# Patient Record
Sex: FEMALE | Race: WHITE | Employment: OTHER | URBAN - METROPOLITAN AREA
[De-identification: names, ages, dates, MRNs, and addresses within clinical notes are randomized per-mention and may not be internally consistent; named-entity substitution may affect disease eponyms.]

---

## 2018-07-06 ENCOUNTER — HOSPITAL ENCOUNTER (OUTPATIENT)
Age: 83
Discharge: HOME | End: 2018-07-08
Payer: COMMERCIAL

## 2018-07-06 ENCOUNTER — HOSPITAL ENCOUNTER (OUTPATIENT)
Dept: WOUND CARE | Age: 83
Discharge: HOME | End: 2018-07-06
Payer: COMMERCIAL

## 2018-07-06 VITALS — WEIGHT: 122 LBS | RESPIRATION RATE: 18 BRPM | TEMPERATURE: 98.6 F | HEIGHT: 62 IN | BODY MASS INDEX: 22.45 KG/M2

## 2018-07-06 DIAGNOSIS — I73.9 PVD (PERIPHERAL VASCULAR DISEASE) (HCC): ICD-10-CM

## 2018-07-06 DIAGNOSIS — L97.912 TRAUMATIC ULCER OF RIGHT LOWER LEG WITH FAT LAYER EXPOSED (HCC): ICD-10-CM

## 2018-07-06 DIAGNOSIS — I82.4Z9 DEEP VEIN THROMBOSIS (DVT) OF DISTAL VEIN OF LOWER EXTREMITY, UNSPECIFIED CHRONICITY, UNSPECIFIED LATERALITY (HCC): Primary | ICD-10-CM

## 2018-07-06 PROCEDURE — 87205 SMEAR GRAM STAIN: CPT

## 2018-07-06 PROCEDURE — 99213 OFFICE O/P EST LOW 20 MIN: CPT

## 2018-07-06 PROCEDURE — 11042 DBRDMT SUBQ TIS 1ST 20SQCM/<: CPT

## 2018-07-06 PROCEDURE — 87070 CULTURE OTHR SPECIMN AEROBIC: CPT

## 2018-07-06 PROCEDURE — 87075 CULTR BACTERIA EXCEPT BLOOD: CPT

## 2018-07-06 RX ORDER — ESCITALOPRAM OXALATE 5 MG/1
5 TABLET ORAL DAILY
COMMUNITY

## 2018-07-06 RX ORDER — LEVOTHYROXINE SODIUM 0.05 MG/1
50 TABLET ORAL DAILY
COMMUNITY

## 2018-07-06 RX ORDER — MEMANTINE HYDROCHLORIDE 28 MG/1
CAPSULE, EXTENDED RELEASE ORAL DAILY
COMMUNITY

## 2018-07-06 RX ORDER — IBUPROFEN 600 MG/1
600 TABLET ORAL DAILY
COMMUNITY
End: 2019-06-03 | Stop reason: SDUPTHER

## 2018-07-06 RX ORDER — DONEPEZIL HYDROCHLORIDE 10 MG/1
10 TABLET, FILM COATED ORAL DAILY
COMMUNITY

## 2018-07-06 ASSESSMENT — PAIN DESCRIPTION - ORIENTATION: ORIENTATION: LEFT;POSTERIOR

## 2018-07-06 ASSESSMENT — PAIN DESCRIPTION - LOCATION: LOCATION: LEG

## 2018-07-06 ASSESSMENT — PAIN DESCRIPTION - PROGRESSION: CLINICAL_PROGRESSION: NOT CHANGED

## 2018-07-06 ASSESSMENT — PAIN DESCRIPTION - PAIN TYPE: TYPE: ACUTE PAIN

## 2018-07-06 ASSESSMENT — PAIN SCALES - GENERAL: PAINLEVEL_OUTOF10: 3

## 2018-07-06 ASSESSMENT — PAIN DESCRIPTION - DESCRIPTORS: DESCRIPTORS: ACHING

## 2018-07-06 NOTE — CONSULTS
blade, all hyperkeratotic, nonviable,  fibrotic tissue removed down into subcutaneous tissue without incident,  excised, flushed with copious amounts of normal sterile saline. Apply  Laurel and dressing change every other day. Keep dressing clean, dry, and  intact. Do not get wet. Rest, elevate, offload. Any problems or  questions, contact my office or contact the [address] [place] [address] as soon as  possible. Arterial ultrasounds are needed and noninvasive studies to help,  evaluate, manage, and treat it accordingly. Ultrasounds are needed to  help, evaluate, manage, and treat it accordingly. Deep tissue culture was  taken to help, evaluate, manage, and treat it accordingly and we will  follow.     I would like to thank [facility] for referral.        Kaleb Box DPM    D: 07/06/2018 9:50:22       T: 07/06/2018 10:43:25     [email or web address]  Job#: [id number]     Doc#: [id number]    CC:  Michelle Ramirez MD

## 2018-07-08 LAB — ANAEROBIC CULTURE: NORMAL

## 2018-07-09 LAB
GRAM STAIN RESULT: NORMAL
WOUND/ABSCESS: NORMAL

## 2018-07-13 ENCOUNTER — HOSPITAL ENCOUNTER (OUTPATIENT)
Dept: WOUND CARE | Age: 83
Discharge: HOME | End: 2018-07-13
Payer: COMMERCIAL

## 2018-07-13 VITALS
SYSTOLIC BLOOD PRESSURE: 138 MMHG | DIASTOLIC BLOOD PRESSURE: 70 MMHG | TEMPERATURE: 98.8 F | BODY MASS INDEX: 22.45 KG/M2 | WEIGHT: 122 LBS | HEIGHT: 62 IN | RESPIRATION RATE: 18 BRPM | HEART RATE: 76 BPM

## 2018-07-13 PROCEDURE — 11042 DBRDMT SUBQ TIS 1ST 20SQCM/<: CPT

## 2018-07-13 NOTE — PROGRESS NOTES
[facility] Followup Visit Note    Referring Physician : Dorys Gracia MD  [facility]. RECORD NUMBER:  [id number]  AGE: 81 y.o. GENDER: female  : 1935  EPISODE DATE:  2018    Subjective:     Chief Complaint   Patient presents with    Wound Check     right lower leg      HISTORY of PRESENT ILLNESS LIBRA Colon is a 81 y.o. female who presents today for wound/ulcer evaluation. History of Wound Context:  1 week     Wound/Ulcer Pain Timing/Severity: constant, intermittent  Quality of pain: aching  Severity:  2 / 10   Modifying Factors: Pain worsens with walking  Associated Signs/Symptoms: edema    Ulcer Identification:  Ulcer Type: venous  Contributing Factors: edema    Diabetic/Pressure/Non Pressure Ulcers only:  Ulcer: Non-Pressure ulcer, fat layer exposed    Wound: N/A        PAST MEDICAL HISTORY      Diagnosis Date    Arthritis     Dementia     Hypothyroid      Past Surgical History:   Procedure Laterality Date    BUNIONECTOMY      HYSTERECTOMY       History reviewed. No pertinent family history.   Social History   Substance Use Topics    Smoking status: Never Smoker    Smokeless tobacco: Never Used    Alcohol use 4.2 oz/week     7 Glasses of wine per week     Allergies   Allergen Reactions    Sulfa Antibiotics Hives     Current Outpatient Prescriptions on File Prior to Encounter   Medication Sig Dispense Refill    methotrexate (RHEUMATREX) 2.5 MG chemo tablet Take 2.5 mg by mouth once a week 6 tabs every week on wednesday      escitalopram (LEXAPRO) 5 MG tablet Take 5 mg by mouth daily      Atorvastatin Calcium (LIPITOR PO) Take 10 mg by mouth every other day      levothyroxine (SYNTHROID) 50 MCG tablet Take 50 mcg by mouth Daily      donepezil (ARICEPT) 10 MG tablet Take 10 mg by mouth daily      memantine ER (NAMENDA XR) 28 MG CP24 extended release capsule Take by mouth daily      ibuprofen (ADVIL;MOTRIN) 600 MG tablet Take 600 mg by mouth daily       No current facility-administered medications on file prior to encounter. REVIEW OF SYSTEMS See HPI    Objective:    /70   Pulse 76   Temp 98.8 °F (37.1 °C) (Oral)   Resp 18   Ht 5' 2\" (1.575 m)   Wt 122 lb (55.3 kg)   BMI 22.31 kg/m²   Wt Readings from Last 3 Encounters:   07/13/18 122 lb (55.3 kg)   07/06/18 122 lb (55.3 kg)     PHYSICAL EXAM  CONSTITUTIONAL:   Awake, alert, cooperative   EYES:  lids and lashes normal   ENT: external ears and nose without lesions   NECK:  supple, symmetrical, trachea midline   SKIN:  Open wound Present    Assessment:     Problem List Items Addressed This Visit     None        Procedure Note  Indications:  Based on my examination of this patient's wound(s)/ulcer(s) today, debridement is required to promote healing and evaluate the wound base. Performed by: Lore Washington DPM    Consent obtained:  Yes    Time out taken:  Yes    Pain Control: Anesthetic  Anesthetic: 2% Lidocaine Gel Topical     Debridement:Excisional Debridement    Using #15 blade scalpel the wound(s)/ulcer(s) was/were sharply debrided down through and including the removal of subcutaneous tissue. Devitalized Tissue Debrided:  fibrin, biofilm, slough and necrotic/eschar to stimulate bleeding to promote healing, post debridement good bleeding base and wound edges noted    Pre Debridement Measurements:  Are located in the [facility]  Documentation Flow Sheet    Wound/Ulcer #: 1    Post Debridement Measurements:  Wound/Ulcer Descriptions are Pre Debridement except measurements:    Wound 07/06/18 Other (Comment) Leg Right;Posterior wound#1 rt posterior calf acquired 6/1/18 (Active)   Wound Image   7/6/2018  9:19 AM   Wound Type Wound 7/6/2018  9:19 AM   Wound Traumatic 7/6/2018  9:19 AM   Dressing Status Clean;Dry; Intact 7/13/2018 12:08 PM   Dressing Changed Changed/New 7/13/2018 12:08 PM   Dressing/Treatment Dry dressing 7/6/2018 10:16 AM   Wound Cleansed Rinsed/Irrigated with saline appointment above and if you are unable to keep, kindly give a 24 hour notice. Thank you.)     Physician signature:__________________________        If you experience any of the following, please call the [facility] during business hours:     * Increase in Pain  * Temperature over 101  * Increase in drainage from your wound  * Drainage with a foul odor  * Bleeding  * Increase in swelling  * Need for compression bandage changes due to slippage, breakthrough drainage.     If you need medical attention outside of the business hours of the [facility] please contact your PCP or go to the nearest emergency room.         Electronically signed by Gibson Small DPM on 7/13/2018 at 12:25 PM

## 2018-07-20 ENCOUNTER — HOSPITAL ENCOUNTER (OUTPATIENT)
Dept: WOUND CARE | Age: 83
Discharge: HOME | End: 2018-07-20
Payer: COMMERCIAL

## 2018-08-10 ENCOUNTER — HOSPITAL ENCOUNTER (OUTPATIENT)
Dept: WOUND CARE | Age: 83
Discharge: HOME | End: 2018-08-10
Payer: COMMERCIAL

## 2018-09-06 DIAGNOSIS — M85.89 OTHER SPECIFIED DISORDERS OF BONE DENSITY AND STRUCTURE, MULTIPLE SITES: ICD-10-CM

## 2018-09-06 RX ORDER — DIPHENHYDRAMINE HYDROCHLORIDE 50 MG/ML
50 INJECTION INTRAMUSCULAR; INTRAVENOUS ONCE
Status: CANCELLED | OUTPATIENT
Start: 2018-09-07 | End: 2018-09-07

## 2018-09-06 RX ORDER — SODIUM CHLORIDE 0.9 % (FLUSH) 0.9 %
10 SYRINGE (ML) INJECTION PRN
Status: CANCELLED | OUTPATIENT
Start: 2018-09-07

## 2018-09-06 RX ORDER — EPINEPHRINE 1 MG/ML
0.3 INJECTION, SOLUTION, CONCENTRATE INTRAVENOUS PRN
Status: CANCELLED | OUTPATIENT
Start: 2018-09-07

## 2018-09-06 RX ORDER — ZOLEDRONIC ACID 5 MG/100ML
5 INJECTION, SOLUTION INTRAVENOUS ONCE
Status: CANCELLED | OUTPATIENT
Start: 2018-09-07 | End: 2018-09-07

## 2018-09-06 RX ORDER — 0.9 % SODIUM CHLORIDE 0.9 %
10 VIAL (ML) INJECTION ONCE
Status: CANCELLED | OUTPATIENT
Start: 2018-09-07 | End: 2018-09-07

## 2018-09-06 RX ORDER — METHYLPREDNISOLONE SODIUM SUCCINATE 125 MG/2ML
125 INJECTION, POWDER, LYOPHILIZED, FOR SOLUTION INTRAMUSCULAR; INTRAVENOUS ONCE
Status: CANCELLED | OUTPATIENT
Start: 2018-09-07 | End: 2018-09-07

## 2018-09-06 RX ORDER — SODIUM CHLORIDE 9 MG/ML
INJECTION, SOLUTION INTRAVENOUS CONTINUOUS
Status: CANCELLED | OUTPATIENT
Start: 2018-09-07

## 2018-09-06 RX ORDER — SODIUM CHLORIDE 9 MG/ML
INJECTION, SOLUTION INTRAVENOUS ONCE
Status: CANCELLED | OUTPATIENT
Start: 2018-09-07 | End: 2018-09-07

## 2018-09-21 NOTE — PROGRESS NOTES
Received a referral for Reclast along with labs. Called to hospital Pharmacist Nikkie to have creatinine clearance calculated. Patient is 81years old, height 5'1\", weight 124lb , gender female, creatinine level 0.73. Pharmacist did calculation and I was told creatinine clearance is 45.

## 2018-09-24 ENCOUNTER — HOSPITAL ENCOUNTER (OUTPATIENT)
Dept: INFUSION THERAPY | Age: 83
Setting detail: INFUSION SERIES
Discharge: HOME | End: 2018-09-24
Payer: COMMERCIAL

## 2018-09-24 VITALS
DIASTOLIC BLOOD PRESSURE: 67 MMHG | HEIGHT: 64 IN | TEMPERATURE: 99 F | HEART RATE: 62 BPM | BODY MASS INDEX: 21.51 KG/M2 | RESPIRATION RATE: 16 BRPM | OXYGEN SATURATION: 99 % | WEIGHT: 126 LBS | SYSTOLIC BLOOD PRESSURE: 151 MMHG

## 2018-09-24 DIAGNOSIS — M85.89 OTHER SPECIFIED DISORDERS OF BONE DENSITY AND STRUCTURE, MULTIPLE SITES: ICD-10-CM

## 2018-09-24 PROCEDURE — 2580000003 HC RX 258: Performed by: INTERNAL MEDICINE

## 2018-09-24 PROCEDURE — 6360000002 HC RX W HCPCS: Performed by: INTERNAL MEDICINE

## 2018-09-24 PROCEDURE — 96365 THER/PROPH/DIAG IV INF INIT: CPT

## 2018-09-24 RX ORDER — ZOLEDRONIC ACID 5 MG/100ML
5 INJECTION, SOLUTION INTRAVENOUS ONCE
Status: CANCELLED | OUTPATIENT
Start: 2018-09-24 | End: 2018-09-24

## 2018-09-24 RX ORDER — METHYLPREDNISOLONE SODIUM SUCCINATE 125 MG/2ML
125 INJECTION, POWDER, LYOPHILIZED, FOR SOLUTION INTRAMUSCULAR; INTRAVENOUS ONCE
Status: CANCELLED | OUTPATIENT
Start: 2018-09-24 | End: 2018-09-24

## 2018-09-24 RX ORDER — SODIUM CHLORIDE 0.9 % (FLUSH) 0.9 %
10 SYRINGE (ML) INJECTION PRN
Status: DISCONTINUED | OUTPATIENT
Start: 2018-09-24 | End: 2018-09-24 | Stop reason: ALTCHOICE

## 2018-09-24 RX ORDER — SODIUM CHLORIDE 9 MG/ML
INJECTION, SOLUTION INTRAVENOUS ONCE
Status: COMPLETED | OUTPATIENT
Start: 2018-09-24 | End: 2018-09-24

## 2018-09-24 RX ORDER — SODIUM CHLORIDE 9 MG/ML
INJECTION, SOLUTION INTRAVENOUS ONCE
Status: CANCELLED | OUTPATIENT
Start: 2018-09-24 | End: 2018-09-24

## 2018-09-24 RX ORDER — 0.9 % SODIUM CHLORIDE 0.9 %
10 VIAL (ML) INJECTION ONCE
Status: CANCELLED | OUTPATIENT
Start: 2018-09-24 | End: 2018-09-24

## 2018-09-24 RX ORDER — ZOLEDRONIC ACID 5 MG/100ML
5 INJECTION, SOLUTION INTRAVENOUS ONCE
Status: COMPLETED | OUTPATIENT
Start: 2018-09-24 | End: 2018-09-24

## 2018-09-24 RX ORDER — DIPHENHYDRAMINE HYDROCHLORIDE 50 MG/ML
50 INJECTION INTRAMUSCULAR; INTRAVENOUS ONCE
Status: CANCELLED | OUTPATIENT
Start: 2018-09-24 | End: 2018-09-24

## 2018-09-24 RX ORDER — EPINEPHRINE 1 MG/ML
0.3 INJECTION, SOLUTION, CONCENTRATE INTRAVENOUS PRN
Status: CANCELLED | OUTPATIENT
Start: 2018-09-24

## 2018-09-24 RX ORDER — SODIUM CHLORIDE 0.9 % (FLUSH) 0.9 %
10 SYRINGE (ML) INJECTION PRN
Status: CANCELLED | OUTPATIENT
Start: 2018-09-24

## 2018-09-24 RX ORDER — SODIUM CHLORIDE 9 MG/ML
INJECTION, SOLUTION INTRAVENOUS CONTINUOUS
Status: CANCELLED | OUTPATIENT
Start: 2018-09-24

## 2018-09-24 RX ADMIN — Medication 10 ML: at 14:04

## 2018-09-24 RX ADMIN — ZOLEDRONIC ACID 5 MG: 5 INJECTION, SOLUTION INTRAVENOUS at 14:14

## 2018-09-24 RX ADMIN — SODIUM CHLORIDE: 9 INJECTION, SOLUTION INTRAVENOUS at 14:14

## 2018-09-24 NOTE — PROGRESS NOTES
Tolerated infusion well. Therapy plan reviewed with patient/daughter. Verbalizes understanding. Reviewed AVS with patient/daughter, reviewed medication information, verbalizes good knowledge of current plan, and has no signs or symptoms to report at this time. copy of AVS given to patient/daughter. They did not want to schedule Next appointment said she can get it every 2 years so once they follow with doctor will schedule closer.

## 2019-06-10 RX ORDER — IBUPROFEN 600 MG/1
TABLET ORAL
Qty: 90 TABLET | Refills: 0 | Status: SHIPPED | OUTPATIENT
Start: 2019-06-10 | End: 2019-08-13 | Stop reason: SDUPTHER

## 2019-08-06 ENCOUNTER — HOSPITAL ENCOUNTER (OUTPATIENT)
Age: 84
Discharge: HOME | End: 2019-08-08
Payer: COMMERCIAL

## 2019-08-06 LAB
ALBUMIN SERPL-MCNC: 4.3 G/DL (ref 3.5–5.2)
ALP BLD-CCNC: 73 U/L (ref 35–104)
ALT SERPL-CCNC: 17 U/L (ref 0–32)
ANION GAP SERPL CALCULATED.3IONS-SCNC: 15 MMOL/L (ref 7–16)
AST SERPL-CCNC: 25 U/L (ref 0–31)
BASOPHILS ABSOLUTE: 0.02 E9/L (ref 0–0.2)
BASOPHILS RELATIVE PERCENT: 0.4 % (ref 0–2)
BILIRUB SERPL-MCNC: 0.6 MG/DL (ref 0–1.2)
BILIRUBIN DIRECT: <0.2 MG/DL (ref 0–0.3)
BILIRUBIN, INDIRECT: NORMAL MG/DL (ref 0–1)
BUN BLDV-MCNC: 20 MG/DL (ref 8–23)
C-REACTIVE PROTEIN: <0.1 MG/DL (ref 0–0.4)
CALCIUM SERPL-MCNC: 9.2 MG/DL (ref 8.6–10.2)
CHLORIDE BLD-SCNC: 103 MMOL/L (ref 98–107)
CO2: 23 MMOL/L (ref 22–29)
CREAT SERPL-MCNC: 0.9 MG/DL (ref 0.5–1)
EOSINOPHILS ABSOLUTE: 0.07 E9/L (ref 0.05–0.5)
EOSINOPHILS RELATIVE PERCENT: 1.5 % (ref 0–6)
GFR AFRICAN AMERICAN: >60
GFR NON-AFRICAN AMERICAN: 60 ML/MIN/1.73
GLUCOSE BLD-MCNC: 97 MG/DL (ref 74–99)
HCT VFR BLD CALC: 40.5 % (ref 34–48)
HEMOGLOBIN: 13.1 G/DL (ref 11.5–15.5)
IMMATURE GRANULOCYTES #: 0.02 E9/L
IMMATURE GRANULOCYTES %: 0.4 % (ref 0–5)
LYMPHOCYTES ABSOLUTE: 1.85 E9/L (ref 1.5–4)
LYMPHOCYTES RELATIVE PERCENT: 39.9 % (ref 20–42)
MCH RBC QN AUTO: 34 PG (ref 26–35)
MCHC RBC AUTO-ENTMCNC: 32.3 % (ref 32–34.5)
MCV RBC AUTO: 105.2 FL (ref 80–99.9)
MONOCYTES ABSOLUTE: 0.56 E9/L (ref 0.1–0.95)
MONOCYTES RELATIVE PERCENT: 12.1 % (ref 2–12)
NEUTROPHILS ABSOLUTE: 2.12 E9/L (ref 1.8–7.3)
NEUTROPHILS RELATIVE PERCENT: 45.7 % (ref 43–80)
PDW BLD-RTO: 14.6 FL (ref 11.5–15)
PLATELET # BLD: 186 E9/L (ref 130–450)
PMV BLD AUTO: 12.6 FL (ref 7–12)
POTASSIUM SERPL-SCNC: 4.6 MMOL/L (ref 3.5–5)
RBC # BLD: 3.85 E12/L (ref 3.5–5.5)
SEDIMENTATION RATE, ERYTHROCYTE: 11 MM/HR (ref 0–20)
SODIUM BLD-SCNC: 141 MMOL/L (ref 132–146)
TOTAL PROTEIN: 7.5 G/DL (ref 6.4–8.3)
WBC # BLD: 4.6 E9/L (ref 4.5–11.5)

## 2019-08-06 PROCEDURE — 36415 COLL VENOUS BLD VENIPUNCTURE: CPT

## 2019-08-06 PROCEDURE — 86140 C-REACTIVE PROTEIN: CPT

## 2019-08-06 PROCEDURE — 82248 BILIRUBIN DIRECT: CPT

## 2019-08-06 PROCEDURE — 80053 COMPREHEN METABOLIC PANEL: CPT

## 2019-08-06 PROCEDURE — 85651 RBC SED RATE NONAUTOMATED: CPT

## 2019-08-06 PROCEDURE — 85025 COMPLETE CBC W/AUTO DIFF WBC: CPT

## 2019-08-08 ENCOUNTER — TELEPHONE (OUTPATIENT)
Dept: RHEUMATOLOGY | Age: 84
End: 2019-08-08

## 2019-08-13 ENCOUNTER — OFFICE VISIT (OUTPATIENT)
Dept: RHEUMATOLOGY | Age: 84
End: 2019-08-13
Payer: COMMERCIAL

## 2019-08-13 VITALS
SYSTOLIC BLOOD PRESSURE: 138 MMHG | HEART RATE: 64 BPM | WEIGHT: 126.6 LBS | DIASTOLIC BLOOD PRESSURE: 62 MMHG | HEIGHT: 63 IN | OXYGEN SATURATION: 98 % | TEMPERATURE: 97.7 F | BODY MASS INDEX: 22.43 KG/M2

## 2019-08-13 DIAGNOSIS — R71.8 ELEVATED MCV: ICD-10-CM

## 2019-08-13 DIAGNOSIS — M05.79 RHEUMATOID ARTHRITIS INVOLVING MULTIPLE SITES WITH POSITIVE RHEUMATOID FACTOR (HCC): Primary | ICD-10-CM

## 2019-08-13 DIAGNOSIS — M81.0 OSTEOPOROSIS WITHOUT CURRENT PATHOLOGICAL FRACTURE, UNSPECIFIED OSTEOPOROSIS TYPE: ICD-10-CM

## 2019-08-13 PROCEDURE — 99214 OFFICE O/P EST MOD 30 MIN: CPT | Performed by: INTERNAL MEDICINE

## 2019-08-13 RX ORDER — UREA 10 %
LOTION (ML) TOPICAL
COMMUNITY
End: 2019-08-13

## 2019-08-13 RX ORDER — IBUPROFEN 600 MG/1
600 TABLET ORAL EVERY 8 HOURS PRN
Qty: 90 TABLET | Refills: 1 | Status: SHIPPED | OUTPATIENT
Start: 2019-08-13

## 2019-08-13 RX ORDER — ZOLEDRONIC ACID 5 MG/100ML
INJECTION, SOLUTION INTRAVENOUS
COMMUNITY
Start: 2017-03-22

## 2019-08-13 RX ORDER — FOLIC ACID 1 MG/1
2 TABLET ORAL DAILY
Qty: 180 TABLET | Refills: 1 | Status: SHIPPED | OUTPATIENT
Start: 2019-08-13

## 2019-08-13 SDOH — HEALTH STABILITY: MENTAL HEALTH: HOW MANY STANDARD DRINKS CONTAINING ALCOHOL DO YOU HAVE ON A TYPICAL DAY?: 1 OR 2

## 2019-08-13 SDOH — HEALTH STABILITY: MENTAL HEALTH: HOW OFTEN DO YOU HAVE A DRINK CONTAINING ALCOHOL?: 4 OR MORE TIMES A WEEK

## 2019-10-22 DIAGNOSIS — M19.90 INFLAMMATORY ARTHRITIS: Primary | ICD-10-CM

## 2020-01-27 ENCOUNTER — TELEPHONE (OUTPATIENT)
Dept: PHARMACY | Facility: CLINIC | Age: 85
End: 2020-01-27

## 2020-01-27 NOTE — LETTER
YJ UPDATE REQUEST  Prescriber:  Anna Colbert MD  [address] / [place] [address]  Phone: [phone]       Fax: [phone]     Patient:  Puja Vidal 1935  [address]  [address] [facility] [phone] (home)      Rationale:   Patient was identified as being non-adherent to atorvastatin 10mg daily. However, patient stated you had decreased atorvastatin to 10mg every OTHER day. If this is not correct, please follow-up with patient. If this is correct, please consider updating rx instructions. Patient would like the following 90 day prescription(s):    Medication: atorvastatin 10mg tab                Si tab po every OTHER day                                                                        #: 45    R: 1       Prescriber Response:    Prescription(s) approved (please [place] one):  YES  NO    Other response: ________________________________________      ______________________________________   __________________  Authorized By       Date     Pharmacy: [facility]                                     Phone:     [phone]    [address]     Fax:     [phone]    [place], [place]      The information transmitted is intended only for the person or entity to which it is addressed and may contain confidential and/or privileged material. Any review, retransmission, dissemination or other use of, or taking of any action in reliance upon, this information by persons or entities other than the intended recipient is prohibited. This document contains information covered under the Privacy Act, 5 (a), and/or the [employer] and Accountability Act ([address]) and its various implementing regulations and must be protected in accordance with those provisions. If you received this in error, please contact the sender and delete the material from any computer.

## 2020-01-27 NOTE — TELEPHONE ENCOUNTER
CLINICAL PHARMACY: ADHERENCE REVIEW    Identified care gap per [employer]: atorvastatin 10 mg. Outreach to discuss therapy. Previous fill history for atorvastatin:  90 day supply filled on 12/5/19  90 day supply filled on 9/4/19  90 day supply filled on 3/11/19    Patient fills at [facility]. Reached patient's daughter to review. She states that [facility] doctor wants her to take atorvastatin every other day now, which explains the gaps in adherence. It looks like [facility] has been filling prescriptions with directions to take 1 tablet daily.        Rex Raphael, PharmD Candidate 2020  [address]  Dept: [phone], option 7

## 2020-03-07 ENCOUNTER — HOSPITAL ENCOUNTER (OUTPATIENT)
Age: 85
Setting detail: OBSERVATION
Discharge: HOME | End: 2020-03-09
Attending: EMERGENCY MEDICINE | Admitting: INTERNAL MEDICINE
Payer: MEDICARE

## 2020-03-07 ENCOUNTER — APPOINTMENT (OUTPATIENT)
Dept: GENERAL RADIOLOGY | Age: 85
End: 2020-03-07
Payer: MEDICARE

## 2020-03-07 ENCOUNTER — APPOINTMENT (OUTPATIENT)
Dept: CT IMAGING | Age: 85
End: 2020-03-07
Payer: MEDICARE

## 2020-03-07 PROBLEM — G45.9 TIA (TRANSIENT ISCHEMIC ATTACK): Status: ACTIVE | Noted: 2020-03-07

## 2020-03-07 LAB
ALBUMIN SERPL-MCNC: 4.3 G/DL (ref 3.5–5.2)
ALP BLD-CCNC: 95 U/L (ref 35–104)
ALT SERPL-CCNC: 16 U/L (ref 0–32)
ANION GAP SERPL CALCULATED.3IONS-SCNC: 16 MMOL/L (ref 7–16)
AST SERPL-CCNC: 29 U/L (ref 0–31)
BASOPHILS ABSOLUTE: 0.01 E9/L (ref 0–0.2)
BASOPHILS RELATIVE PERCENT: 0.2 % (ref 0–2)
BILIRUB SERPL-MCNC: 0.4 MG/DL (ref 0–1.2)
BILIRUBIN URINE: NEGATIVE
BLOOD, URINE: NEGATIVE
BUN BLDV-MCNC: 13 MG/DL (ref 8–23)
CALCIUM SERPL-MCNC: 9.1 MG/DL (ref 8.6–10.2)
CHLORIDE BLD-SCNC: 103 MMOL/L (ref 98–107)
CLARITY: CLEAR
CO2: 20 MMOL/L (ref 22–29)
COLOR: YELLOW
CREAT SERPL-MCNC: 0.7 MG/DL (ref 0.5–1)
EOSINOPHILS ABSOLUTE: 0.06 E9/L (ref 0.05–0.5)
EOSINOPHILS RELATIVE PERCENT: 1 % (ref 0–6)
GFR AFRICAN AMERICAN: >60
GFR NON-AFRICAN AMERICAN: >60 ML/MIN/1.73
GLUCOSE BLD-MCNC: 102 MG/DL (ref 74–99)
GLUCOSE URINE: NEGATIVE MG/DL
HCT VFR BLD CALC: 38.7 % (ref 34–48)
HEMOGLOBIN: 12.2 G/DL (ref 11.5–15.5)
IMMATURE GRANULOCYTES #: 0.02 E9/L
IMMATURE GRANULOCYTES %: 0.3 % (ref 0–5)
KETONES, URINE: NEGATIVE MG/DL
LEUKOCYTE ESTERASE, URINE: NEGATIVE
LYMPHOCYTES ABSOLUTE: 1.95 E9/L (ref 1.5–4)
LYMPHOCYTES RELATIVE PERCENT: 31.9 % (ref 20–42)
MCH RBC QN AUTO: 32.4 PG (ref 26–35)
MCHC RBC AUTO-ENTMCNC: 31.5 % (ref 32–34.5)
MCV RBC AUTO: 102.7 FL (ref 80–99.9)
METER GLUCOSE: 103 MG/DL (ref 74–99)
MONOCYTES ABSOLUTE: 0.53 E9/L (ref 0.1–0.95)
MONOCYTES RELATIVE PERCENT: 8.7 % (ref 2–12)
NEUTROPHILS ABSOLUTE: 3.55 E9/L (ref 1.8–7.3)
NEUTROPHILS RELATIVE PERCENT: 57.9 % (ref 43–80)
NITRITE, URINE: NEGATIVE
PDW BLD-RTO: 13.3 FL (ref 11.5–15)
PH UA: 6 (ref 5–9)
PLATELET # BLD: 202 E9/L (ref 130–450)
PMV BLD AUTO: 11.8 FL (ref 7–12)
POTASSIUM REFLEX MAGNESIUM: 4.4 MMOL/L (ref 3.5–5)
PRO-BNP: 323 PG/ML (ref 0–450)
PROTEIN UA: NEGATIVE MG/DL
RBC # BLD: 3.77 E12/L (ref 3.5–5.5)
SODIUM BLD-SCNC: 139 MMOL/L (ref 132–146)
SPECIFIC GRAVITY UA: 1.01 (ref 1–1.03)
TOTAL PROTEIN: 7.8 G/DL (ref 6.4–8.3)
TROPONIN: <0.01 NG/ML (ref 0–0.03)
UROBILINOGEN, URINE: 0.2 E.U./DL
WBC # BLD: 6.1 E9/L (ref 4.5–11.5)

## 2020-03-07 PROCEDURE — 82962 GLUCOSE BLOOD TEST: CPT

## 2020-03-07 PROCEDURE — 84484 ASSAY OF TROPONIN QUANT: CPT

## 2020-03-07 PROCEDURE — 93005 ELECTROCARDIOGRAM TRACING: CPT | Performed by: EMERGENCY MEDICINE

## 2020-03-07 PROCEDURE — 70450 CT HEAD/BRAIN W/O DYE: CPT

## 2020-03-07 PROCEDURE — 80053 COMPREHEN METABOLIC PANEL: CPT

## 2020-03-07 PROCEDURE — 83880 ASSAY OF NATRIURETIC PEPTIDE: CPT

## 2020-03-07 PROCEDURE — 71045 X-RAY EXAM CHEST 1 VIEW: CPT

## 2020-03-07 PROCEDURE — 85025 COMPLETE CBC W/AUTO DIFF WBC: CPT

## 2020-03-07 PROCEDURE — 81003 URINALYSIS AUTO W/O SCOPE: CPT

## 2020-03-07 PROCEDURE — 99285 EMERGENCY DEPT VISIT HI MDM: CPT

## 2020-03-07 ASSESSMENT — PAIN DESCRIPTION - LOCATION: LOCATION: BACK

## 2020-03-07 ASSESSMENT — PAIN SCALES - GENERAL: PAINLEVEL_OUTOF10: 5

## 2020-03-07 ASSESSMENT — PAIN DESCRIPTION - PAIN TYPE: TYPE: ACUTE PAIN

## 2020-03-08 ENCOUNTER — APPOINTMENT (OUTPATIENT)
Dept: ULTRASOUND IMAGING | Age: 85
End: 2020-03-08
Payer: MEDICARE

## 2020-03-08 LAB
EKG ATRIAL RATE: 68 BPM
EKG P AXIS: 83 DEGREES
EKG P-R INTERVAL: 166 MS
EKG Q-T INTERVAL: 402 MS
EKG QRS DURATION: 72 MS
EKG QTC CALCULATION (BAZETT): 427 MS
EKG R AXIS: 22 DEGREES
EKG T AXIS: 50 DEGREES
EKG VENTRICULAR RATE: 68 BPM
FOLATE: >20 NG/ML (ref 4.8–24.2)
SEDIMENTATION RATE, ERYTHROCYTE: 25 MM/HR (ref 0–20)
TSH SERPL DL<=0.05 MIU/L-ACNC: 2.6 UIU/ML (ref 0.27–4.2)
VITAMIN B-12: 305 PG/ML (ref 211–946)

## 2020-03-08 PROCEDURE — 84443 ASSAY THYROID STIM HORMONE: CPT

## 2020-03-08 PROCEDURE — 93880 EXTRACRANIAL BILAT STUDY: CPT

## 2020-03-08 PROCEDURE — 96372 THER/PROPH/DIAG INJ SC/IM: CPT

## 2020-03-08 PROCEDURE — 85651 RBC SED RATE NONAUTOMATED: CPT

## 2020-03-08 PROCEDURE — 2580000003 HC RX 258: Performed by: INTERNAL MEDICINE

## 2020-03-08 PROCEDURE — 83921 ORGANIC ACID SINGLE QUANT: CPT

## 2020-03-08 PROCEDURE — 82607 VITAMIN B-12: CPT

## 2020-03-08 PROCEDURE — 6360000002 HC RX W HCPCS: Performed by: INTERNAL MEDICINE

## 2020-03-08 PROCEDURE — 99219 PR INITIAL OBSERVATION CARE/DAY 50 MINUTES: CPT | Performed by: PSYCHIATRY & NEUROLOGY

## 2020-03-08 PROCEDURE — G0378 HOSPITAL OBSERVATION PER HR: HCPCS

## 2020-03-08 PROCEDURE — 6370000000 HC RX 637 (ALT 250 FOR IP): Performed by: INTERNAL MEDICINE

## 2020-03-08 PROCEDURE — 93010 ELECTROCARDIOGRAM REPORT: CPT | Performed by: INTERNAL MEDICINE

## 2020-03-08 PROCEDURE — 36415 COLL VENOUS BLD VENIPUNCTURE: CPT

## 2020-03-08 PROCEDURE — 82746 ASSAY OF FOLIC ACID SERUM: CPT

## 2020-03-08 RX ORDER — LEVOTHYROXINE SODIUM 0.05 MG/1
50 TABLET ORAL DAILY
Status: DISCONTINUED | OUTPATIENT
Start: 2020-03-08 | End: 2020-03-09 | Stop reason: HOSPADM

## 2020-03-08 RX ORDER — SODIUM CHLORIDE 0.9 % (FLUSH) 0.9 %
10 SYRINGE (ML) INJECTION EVERY 12 HOURS SCHEDULED
Status: DISCONTINUED | OUTPATIENT
Start: 2020-03-08 | End: 2020-03-09 | Stop reason: HOSPADM

## 2020-03-08 RX ORDER — ATORVASTATIN CALCIUM 10 MG/1
10 TABLET, FILM COATED ORAL EVERY OTHER DAY
Status: DISCONTINUED | OUTPATIENT
Start: 2020-03-08 | End: 2020-03-08

## 2020-03-08 RX ORDER — LOSARTAN POTASSIUM 25 MG/1
25 TABLET ORAL DAILY
Status: DISCONTINUED | OUTPATIENT
Start: 2020-03-08 | End: 2020-03-09 | Stop reason: HOSPADM

## 2020-03-08 RX ORDER — DONEPEZIL HYDROCHLORIDE 5 MG/1
10 TABLET, FILM COATED ORAL DAILY
Status: DISCONTINUED | OUTPATIENT
Start: 2020-03-08 | End: 2020-03-09 | Stop reason: HOSPADM

## 2020-03-08 RX ORDER — ESCITALOPRAM OXALATE 10 MG/1
5 TABLET ORAL DAILY
Status: DISCONTINUED | OUTPATIENT
Start: 2020-03-08 | End: 2020-03-09 | Stop reason: HOSPADM

## 2020-03-08 RX ORDER — FOLIC ACID 1 MG/1
2 TABLET ORAL DAILY
Status: DISCONTINUED | OUTPATIENT
Start: 2020-03-08 | End: 2020-03-09 | Stop reason: HOSPADM

## 2020-03-08 RX ORDER — MEMANTINE HYDROCHLORIDE 5 MG/1
5 TABLET ORAL DAILY
Status: DISCONTINUED | OUTPATIENT
Start: 2020-03-08 | End: 2020-03-09 | Stop reason: HOSPADM

## 2020-03-08 RX ORDER — ATORVASTATIN CALCIUM 20 MG/1
20 TABLET, FILM COATED ORAL EVERY OTHER DAY
Status: DISCONTINUED | OUTPATIENT
Start: 2020-03-08 | End: 2020-03-09 | Stop reason: HOSPADM

## 2020-03-08 RX ORDER — SODIUM CHLORIDE 0.9 % (FLUSH) 0.9 %
10 SYRINGE (ML) INJECTION PRN
Status: DISCONTINUED | OUTPATIENT
Start: 2020-03-08 | End: 2020-03-09 | Stop reason: HOSPADM

## 2020-03-08 RX ORDER — ASPIRIN 81 MG/1
81 TABLET, CHEWABLE ORAL DAILY
Status: DISCONTINUED | OUTPATIENT
Start: 2020-03-08 | End: 2020-03-08

## 2020-03-08 RX ORDER — CYANOCOBALAMIN 1000 UG/ML
1000 INJECTION INTRAMUSCULAR; SUBCUTANEOUS ONCE
Status: COMPLETED | OUTPATIENT
Start: 2020-03-08 | End: 2020-03-08

## 2020-03-08 RX ADMIN — CYANOCOBALAMIN 1000 MCG: 1000 INJECTION, SOLUTION INTRAMUSCULAR; SUBCUTANEOUS at 18:00

## 2020-03-08 RX ADMIN — SODIUM CHLORIDE, PRESERVATIVE FREE 10 ML: 5 INJECTION INTRAVENOUS at 08:34

## 2020-03-08 RX ADMIN — DONEPEZIL HYDROCHLORIDE 10 MG: 5 TABLET, FILM COATED ORAL at 08:33

## 2020-03-08 RX ADMIN — ESCITALOPRAM 5 MG: 10 TABLET, FILM COATED ORAL at 08:33

## 2020-03-08 RX ADMIN — LEVOTHYROXINE SODIUM 50 MCG: 0.05 TABLET ORAL at 08:33

## 2020-03-08 RX ADMIN — ATORVASTATIN CALCIUM 20 MG: 10 TABLET, FILM COATED ORAL at 21:32

## 2020-03-08 RX ADMIN — MEMANTINE HYDROCHLORIDE 5 MG: 5 TABLET, FILM COATED ORAL at 08:33

## 2020-03-08 RX ADMIN — ASPIRIN 81 MG 81 MG: 81 TABLET ORAL at 08:38

## 2020-03-08 RX ADMIN — SODIUM CHLORIDE, PRESERVATIVE FREE 10 ML: 5 INJECTION INTRAVENOUS at 21:33

## 2020-03-08 RX ADMIN — LOSARTAN POTASSIUM 25 MG: 25 TABLET, FILM COATED ORAL at 11:50

## 2020-03-08 RX ADMIN — FOLIC ACID 2 MG: 1 TABLET ORAL at 08:38

## 2020-03-08 ASSESSMENT — PAIN DESCRIPTION - LOCATION: LOCATION: BACK

## 2020-03-08 ASSESSMENT — PAIN SCALES - GENERAL
PAINLEVEL_OUTOF10: 0
PAINLEVEL_OUTOF10: 7

## 2020-03-08 ASSESSMENT — PAIN DESCRIPTION - PAIN TYPE: TYPE: ACUTE PAIN

## 2020-03-08 NOTE — H&P
History and Physical      CHIEF COMPLAINT:    Confusion    History of Present Illness: This is an 81-year-old female with a past history known for hyperlipidemia, hypothyroidism, rheumatoid arthritis. She also has a known history of dementia with no behavioral disorder. Patient lives by herself and her daughter frequently checks on her. It appears that yesterday and the day before she had been complaining of just diffuse fatigue and tiredness and her daughter noticed to to be confused. She was brought to the ER it was noted that her blood pressure was slightly high but imaging did not reveal any acute issues. Patient appeared to be at her baseline with some mild confusion. She did not appear to be febrile. Her white count was normal her urinalysis was negative for infection. She was brought in for work-up of presumed TIA      Past Medical History:   Diagnosis Date    Arthritis     Dementia ([address]     Hyperlipidemia     Hypothyroid          Past Surgical History:   Procedure Laterality Date    BUNIONECTOMY      HYSTERECTOMY         Medications Prior to Admission:    Medications Prior to Admission: methotrexate (RHEUMATREX) 2.5 MG chemo tablet, Take 6 tablets by mouth once a week 6 tabs every week on wednesday  ibuprofen (ADVIL;MOTRIN) 600 MG tablet, Take 1 tablet by mouth every 8 hours as needed for Pain  folic acid (FOLVITE) 1 MG tablet, Take 2 tablets by mouth daily  ASPIRIN 81 PO, Take by mouth daily  escitalopram (LEXAPRO) 5 MG tablet, Take 5 mg by mouth daily  levothyroxine (SYNTHROID) 50 MCG tablet, Take 50 mcg by mouth Daily  donepezil (ARICEPT) 10 MG tablet, Take 10 mg by mouth daily  memantine ER (NAMENDA XR) 28 MG CP24 extended release capsule, Take by mouth daily  zoledronic acid (RECLAST) 5 MG/100ML SOLN,   Atorvastatin Calcium (LIPITOR PO), Take 10 mg by mouth every other day    Allergies:    Sulfa antibiotics    Social History:    reports that she has never smoked.  She has never used smokeless tobacco. She reports current alcohol use of about 7.0 standard drinks of alcohol per week. She reports that she does not use drugs. Family History:   family history is not on file. REVIEW OF SYSTEMS:  Gen: Negative for nausea, vomiting, diarrhea, fever, chills, night sweats  HEENT: Negative for double vision, blurred vision, sore throat   Heart: Negative for HTN, palpitations, chest pain  Lungs: Negative for wheezes, asthma or SOB  GI: Negative for nausea, vomiting  : Negative for dysuria, hematuria  Endo: Negative for diabetes, thyroid disorders  Heme: Negative for DVT or bleeding disorders  Psych: Negative for Depression or anxiety  Ortho: Positive for increase in her pain over the last couple of days in her shoulders and her joints    PHYSICAL EXAM:    Vitals:  BP (!) 169/89   Pulse 64   Temp 98 °F (36.7 °C) (Temporal)   Resp 16   Ht 5' 3\" (1.6 m)   Wt 123 lb (55.8 kg)   SpO2 97%   BMI 21.79 kg/m²     General:  Awake, alert, oriented X 1. Well developed, well nourished, well groomed. No apparent distress. HEENT:  Normocephalic, atraumatic. Pupils equal, round, reactive to light. No scleral icterus. No conjunctival injection. Normal lips, teeth, and gums. No nasal discharge. Neck:  Supple  Heart:  RRR, no murmurs, gallops, or rubs  Lungs:  CTA bilaterally, bilat symmetrical expansion, no wheeze, rales, or rhonchi  Abdomen:   Bowel sounds present, soft, nontender, no masses, no organomegaly, no peritoneal signs  Extremities:  No clubbing, cyanosis, or edema  Skin:  Warm and dry, no open lesions or rash  Neuro:  Cranial nerves 2-12 intact, no focal deficits  Breast: deferred  Rectal: deferred  Genitalia:  deferred    LABS:  CBC:   Lab Results   Component Value Date    WBC 6.1 03/07/2020    RBC 3.77 03/07/2020    HGB 12.2 03/07/2020    HCT 38.7 03/07/2020    .7 03/07/2020    MCH 32.4 03/07/2020    MCHC 31.5 03/07/2020    RDW 13.3 03/07/2020     03/07/2020    MPV 11.8 endosteal surface in the left frontal region could be a tiny indolent meningioma of only 4 mm diameter. Negative noncontrast CT study. Specifically, there is no evidence of intracranial hemorrhage or mass effect, and no calvarial traumatic findings are noted. Xr Chest Portable    Result Date: 3/7/2020  Patient MRN:  [id number] : 1935 Age: 81 years Gender: Female Order Date:  3/7/2020 7:00 PM TECHNIQUE/NUMBER OF IMAGES/COMPARISON/CLINICAL HISTORY: Chest AP 1 image one view History new onset of altered mental status. FINDINGS: Heart has a normal size, mediastinum appears unremarkable. The lungs are clear, no infiltrates, consolidations or pleural effusions seen. There is no perihilar vascular congestion. No acute cardiopulmonary process. ASSESSMENT:      Principal Problem:    TIA (transient ischemic attack)  Active Problems:    Dementia ([address]    Hyperlipidemia  Resolved Problems:    * No resolved hospital problems. *      PLAN:    Patient appears to be mildly macrocytic which is typical with patients on methotrexate. She also appears to have hypertension which is new for her. We can add a low-dose angiotensin receptor blocker on board. Continue to work with neurology service. Carotid ultrasound has been ordered. 2D echo has been ordered. Lovenox has been ordered. Aspirin is on board. Patient right now is at her baseline confusion. She recalls who I am but is unable to give me my name. She is able to give a generalized summary of the events but does not know the right year however does know the right month. Await testing.   Expect patient may be discharged if work-up is complete and negative  Sed rate ordered to check for activity of rheumatoid        Please note that over 50 minutes was spent in evaluating the patient, review of records and results, discussion with staff/family, etc.      Electronically signed by Mirna Wheat MD on 3/8/2020 at 8:31 AM

## 2020-03-08 NOTE — ED PROVIDER NOTES
HPI  This is an 82 yo F with a PMHx significant for dementia, HLD, hypothyroidism and arthritis (?rheumatoid?) who presents with new onset confusion and a last known well of 10:30 am this morning. The patient is unable to provide any meaningful history regarding her presentation. The patient is currently taking no blood thinners. The patient denies all tobacco and illicit drug use. She endorses drinking wine most days of the week. A very limited history is provided by the patient. The remaining history was obtained from EMS and via chart review. Review of Systems   Unable to perform ROS: Mental status change        Physical Exam  Vitals signs and nursing note reviewed. Constitutional:       General: She is not in acute distress. Appearance: She is well-developed. She is not diaphoretic. HENT:      Head: Normocephalic and atraumatic. Right Ear: External ear normal.      Left Ear: External ear normal.      Nose: Nose normal.      Mouth/Throat:      Mouth: Mucous membranes are moist.      Pharynx: Oropharynx is clear. Eyes:      General: No visual field deficit or scleral icterus. Extraocular Movements: Extraocular movements intact. Conjunctiva/sclera: Conjunctivae normal.      Pupils: Pupils are equal, round, and reactive to light. Neck:      Musculoskeletal: Normal range of motion and neck supple. No neck rigidity or muscular tenderness. Cardiovascular:      Rate and Rhythm: Normal rate and regular rhythm. Pulses: Normal pulses. Heart sounds: Normal heart sounds. No murmur. No friction rub. No gallop. Pulmonary:      Effort: Pulmonary effort is normal. No respiratory distress. Breath sounds: Normal breath sounds. No wheezing, rhonchi or rales. Abdominal:      General: Bowel sounds are normal. There is no distension. Palpations: Abdomen is soft. Tenderness: There is no abdominal tenderness. There is no guarding or rebound.    Musculoskeletal: General: No tenderness or deformity. Right lower leg: No edema. Left lower leg: No edema. Comments: Decreased ROM in the left arm (chronic); ROM is otherwise intact and normal   Lymphadenopathy:      Cervical: No cervical adenopathy. Skin:     General: Skin is warm and dry. Capillary Refill: Capillary refill takes less than 2 seconds. Findings: No erythema or rash. Neurological:      Mental Status: She is alert. She is disoriented and confused. GCS: GCS eye subscore is 4. GCS verbal subscore is 4. GCS motor subscore is 6. Cranial Nerves: No cranial nerve deficit, dysarthria or facial asymmetry. Sensory: Sensation is intact. Coordination: Coordination normal. Finger-Nose-Finger Test normal.      Comments: Motor function grossly intact       Initial NIH: 2    Procedures:  No procedures performed. --------------------------------------------- PAST HISTORY ---------------------------------------------  Past Medical History:  has a past medical history of Arthritis, Dementia ([address] Hyperlipidemia, and Hypothyroid. Past Surgical History:  has a past surgical history that includes Hysterectomy and Bunionectomy. Social History:  reports that she has never smoked. She has never used smokeless tobacco. She reports current alcohol use of about 7.0 standard drinks of alcohol per week. She reports that she does not use drugs. Family History: family history is not on file. The patients home medications have been reviewed.     Allergies: Sulfa antibiotics    -------------------------------------------------- RESULTS -------------------------------------------------    LABS:  Results for orders placed or performed during the hospital encounter of 03/07/20   CBC Auto Differential   Result Value Ref Range    WBC 6.1 4.5 - 11.5 E9/L    RBC 3.77 3.50 - 5.50 E12/L    Hemoglobin 12.2 11.5 - 15.5 g/dL    Hematocrit 38.7 34.0 - 48.0 %    .7 (H) 80.0 - 99.9 fL MCH 32.4 26.0 - 35.0 pg    MCHC 31.5 (L) 32.0 - 34.5 %    RDW 13.3 11.5 - 15.0 fL    Platelets 548 250 - 457 E9/L    MPV 11.8 7.0 - 12.0 fL    Neutrophils % 57.9 43.0 - 80.0 %    Immature Granulocytes % 0.3 0.0 - 5.0 %    Lymphocytes % 31.9 20.0 - 42.0 %    Monocytes % 8.7 2.0 - 12.0 %    Eosinophils % 1.0 0.0 - 6.0 %    Basophils % 0.2 0.0 - 2.0 %    Neutrophils Absolute 3.55 1.80 - 7.30 E9/L    Immature Granulocytes # 0.02 E9/L    Lymphocytes Absolute 1.95 1.50 - 4.00 E9/L    Monocytes Absolute 0.53 0.10 - 0.95 E9/L    Eosinophils Absolute 0.06 0.05 - 0.50 E9/L    Basophils Absolute 0.01 0.00 - 0.20 E9/L   Comprehensive Metabolic Panel w/ Reflex to MG   Result Value Ref Range    Sodium 139 132 - 146 mmol/L    Potassium reflex Magnesium 4.4 3.5 - 5.0 mmol/L    Chloride 103 98 - 107 mmol/L    CO2 20 (L) 22 - 29 mmol/L    Anion Gap 16 7 - 16 mmol/L    Glucose 102 (H) 74 - 99 mg/dL    BUN 13 8 - 23 mg/dL    CREATININE 0.7 0.5 - 1.0 mg/dL    GFR Non-African American >60 >=60 mL/min/1.73    GFR African American >60     Calcium 9.1 8.6 - 10.2 mg/dL    Total Protein 7.8 6.4 - 8.3 g/dL    Alb 4.3 3.5 - 5.2 g/dL    Total Bilirubin 0.4 0.0 - 1.2 mg/dL    Alkaline Phosphatase 95 35 - 104 U/L    ALT 16 0 - 32 U/L    AST 29 0 - 31 U/L   Troponin   Result Value Ref Range    Troponin <0.01 0.00 - 0.03 ng/mL   Brain Natriuretic Peptide   Result Value Ref Range    Pro- 0 - 450 pg/mL   Urinalysis, reflex to microscopic   Result Value Ref Range    Color, UA Yellow Straw/Yellow    Clarity, UA Clear Clear    Glucose, Ur Negative Negative mg/dL    Bilirubin Urine Negative Negative    Ketones, Urine Negative Negative mg/dL    Specific Gravity, UA 1.010 1.005 - 1.030    Blood, Urine Negative Negative    pH, UA 6.0 5.0 - 9.0    Protein, UA Negative Negative mg/dL    Urobilinogen, Urine 0.2 <2.0 E.U./dL    Nitrite, Urine Negative Negative    Leukocyte Esterase, Urine Negative Negative   POCT Glucose   Result Value Ref Range    Meter Glucose 103 (H) 74 - 99 mg/dL   EKG 12 Lead   Result Value Ref Range    Ventricular Rate 68 BPM    Atrial Rate 68 BPM    P-R Interval 166 ms    QRS Duration 72 ms    Q-T Interval 402 ms    QTc Calculation (Bazett) 427 ms    P Axis 83 degrees    R Axis 22 degrees    T Axis 50 degrees       EKG:  Rate: 68 bpm  Rhythm: Sinus  Axis: normal  ST Segments: nonspecific changes  T-Waves: no acute change  Interpretation: borderline EKG  Comparison: no previous EKG    RADIOLOGY:  CT Head WO Contrast   Final Result   Negative noncontrast CT study. Specifically, there is no evidence of intracranial hemorrhage or mass   effect, and no calvarial traumatic findings are noted. XR CHEST PORTABLE   Final Result   No acute cardiopulmonary process. ------------------------- NURSING NOTES AND VITALS REVIEWED ---------------------------  Date / Time Roomed:  3/7/2020  6:33 PM  ED Bed Assignment:  [id number]    The nursing notes within the ED encounter and vital signs as below have been reviewed. Patient Vitals for the past 24 hrs:   BP Temp Temp src Pulse Resp SpO2 Height Weight   03/08/20 0300 (!) 169/89 98 °F (36.7 °C) Temporal 64 16 97 % -- 123 lb (55.8 kg)   03/08/20 0012 (!) 163/85 97.9 °F (36.6 °C) -- 60 16 99 % -- --   03/07/20 2256 (!) 164/80 -- -- 62 16 99 % -- --   03/07/20 1849 (!) 197/89 -- -- 63 9 99 % -- --   03/07/20 1836 (!) 181/100 98 °F (36.7 °C) Temporal 74 18 99 % 5' 3\" (1.6 m) 125 lb (56.7 kg)       Oxygen Saturation Interpretation: Normal        --------------------------------- PROGRESS NOTES / ADDITIONAL PROVIDER NOTES ---------------------------------  Consultations:  As outlined below. ED Course:    ED Course as of Mar 08 0706   Sat Mar 07, 2020   0073 This resident in to evaluate the patient. [ML]   Sun Mar 08, 2020   0023 Dr. Shivam Shah discussed the case with Dr. Armand Zamarripa who agrees to admit the patient for further evaluation and management.      [ML]      ED Course User Index  [ML] Kimani Pan DO       7345: All results were discussed with the patient and family and I have provided specific details regarding the plan to admit the patient. The patient was stable at the time of admission and was without objective evidence of hemodynamic instability. She was seen in the emergency department by myself and the assigned attending physician, Dr. Charan Fitzpatrick, who agreed with the assessment, plan and decision to admit as laid out herein. The patient verbalized an understanding and agreement with the plan for admission. All questions were answered and the patient was deemed to be in stable condition at the time of transport. MDM:  Patient presented from home with new onset confusion. On arrival, she was hypertensive with remaining vitals signs within normal limits. DDX included, but was not limited to: stroke, TIA, ACS, PE, PNA, URI, influenza, UTI, pyelonephritis, sepsis, delirium, TBI, SAH    Given the patient's clinical presentation, history and exam, the decision was made to further evaluate her complaints with EKG, labs and imaging. The patient's work-up revealed no acute findings requiring emergent intervention. However, based on her presentation and improved, but ongoing confusion, the decision was made to admit the patient for further evaluation and management. This patient's ED course included: a personal history and physicial examination, re-evaluation prior to disposition, multiple bedside re-evaluations, cardiac monitoring and continuous pulse oximetry    Diagnosis:  1. TIA (transient ischemic attack)        Disposition:  Patient's disposition: Admit to telemetry  Patient's condition is stable.        Kimani Pan DO  Resident  03/08/20 0809

## 2020-03-08 NOTE — PROGRESS NOTES
This nurse called MARINA Gonzalez via perfect serve to obtain new admission  orders for this patient.

## 2020-03-09 VITALS
BODY MASS INDEX: 21.79 KG/M2 | WEIGHT: 123 LBS | TEMPERATURE: 98.3 F | OXYGEN SATURATION: 96 % | SYSTOLIC BLOOD PRESSURE: 127 MMHG | RESPIRATION RATE: 16 BRPM | DIASTOLIC BLOOD PRESSURE: 81 MMHG | HEART RATE: 91 BPM | HEIGHT: 63 IN

## 2020-03-09 PROCEDURE — 6370000000 HC RX 637 (ALT 250 FOR IP): Performed by: INTERNAL MEDICINE

## 2020-03-09 PROCEDURE — 2580000003 HC RX 258: Performed by: INTERNAL MEDICINE

## 2020-03-09 PROCEDURE — 96372 THER/PROPH/DIAG INJ SC/IM: CPT

## 2020-03-09 PROCEDURE — 6360000002 HC RX W HCPCS: Performed by: INTERNAL MEDICINE

## 2020-03-09 PROCEDURE — G0378 HOSPITAL OBSERVATION PER HR: HCPCS

## 2020-03-09 RX ORDER — LOSARTAN POTASSIUM 25 MG/1
25 TABLET ORAL DAILY
Qty: 30 TABLET | Refills: 3 | Status: SHIPPED | OUTPATIENT
Start: 2020-03-10

## 2020-03-09 RX ADMIN — ENOXAPARIN SODIUM 40 MG: 40 INJECTION SUBCUTANEOUS at 08:53

## 2020-03-09 RX ADMIN — MEMANTINE HYDROCHLORIDE 5 MG: 5 TABLET, FILM COATED ORAL at 08:52

## 2020-03-09 RX ADMIN — ESCITALOPRAM 5 MG: 10 TABLET, FILM COATED ORAL at 08:52

## 2020-03-09 RX ADMIN — DONEPEZIL HYDROCHLORIDE 10 MG: 5 TABLET, FILM COATED ORAL at 08:52

## 2020-03-09 RX ADMIN — LOSARTAN POTASSIUM 25 MG: 25 TABLET, FILM COATED ORAL at 08:52

## 2020-03-09 RX ADMIN — SODIUM CHLORIDE, PRESERVATIVE FREE 10 ML: 5 INJECTION INTRAVENOUS at 08:53

## 2020-03-09 RX ADMIN — LEVOTHYROXINE SODIUM 50 MCG: 0.05 TABLET ORAL at 06:38

## 2020-03-09 RX ADMIN — FOLIC ACID 2 MG: 1 TABLET ORAL at 08:52

## 2020-03-09 ASSESSMENT — PAIN SCALES - GENERAL: PAINLEVEL_OUTOF10: 0

## 2020-03-09 NOTE — DISCHARGE SUMMARY
deferred      Disposition: home    Condition at discharge: stable    Patient Instructions:   Current Discharge Medication List      START taking these medications    Details   losartan (COZAAR) 25 MG tablet Take 1 tablet by mouth daily  Qty: 30 tablet, Refills: 3         CONTINUE these medications which have NOT CHANGED    Details   methotrexate (RHEUMATREX) 2.5 MG chemo tablet Take 6 tablets by mouth once a week 6 tabs every week on wednesday  Qty: 72 tablet, Refills: 1    Associated Diagnoses: Inflammatory arthritis      ibuprofen (ADVIL;MOTRIN) 600 MG tablet Take 1 tablet by mouth every 8 hours as needed for Pain  Qty: 90 tablet, Refills: 1      folic acid (FOLVITE) 1 MG tablet Take 2 tablets by mouth daily  Qty: 180 tablet, Refills: 1      ASPIRIN 81 PO Take by mouth daily      escitalopram (LEXAPRO) 5 MG tablet Take 5 mg by mouth daily      levothyroxine (SYNTHROID) 50 MCG tablet Take 50 mcg by mouth Daily      donepezil (ARICEPT) 10 MG tablet Take 10 mg by mouth daily      memantine ER (NAMENDA XR) 28 MG CP24 extended release capsule Take by mouth daily      zoledronic acid (RECLAST) 5 MG/100ML SOLN       Atorvastatin Calcium (LIPITOR PO) Take 10 mg by mouth every other day         Ct Head Wo Contrast    Result Date: 3/7/2020  Patient MRN:  [id number] : 1935 Age: 81 years Gender: Female Order Date:  3/7/2020 7:00 PM EXAM: CT HEAD WO CONTRAST NUMBER OF IMAGES:  146 INDICATION: Depressed level of consciousness COMPARISON: None TECHNIQUE: Axial noncontrast images were extended through the head, and are reformatted in all 3 imaging planes. Images reviewed at soft tissue and bone window settings. Low-dose CT  acquisition technique included one of following options; 1 . Automated exposure control, 2. Adjustment of MA and or KV according to patient's size or 3. Use of iterative reconstruction. FINDINGS: Intracranial anatomy shows symmetry of cisterns, sulci, and ventricles, which are proportionate and symmetric. Gray-white matter differentiation is normal. There is an old subcentimeter lacunar infarct in the lentiform nuclear region on the left. Punctate basal medullary calcifications are present bilaterally. There is no evidence of hemorrhage or mass effect, and no pathologic extra-axial fluid collections are noted. There is no evidence of skeletal trauma or sinus opacification. Extracranial soft tissues show no evidence of acute pathology. Mastoid air cells and middle ear cavities are normally aerated. There is evidence of prior right-sided cataract surgery. Dense cavernous carotid atherosclerotic calcification is noted. A tiny density along the inner table of the skull and endosteal surface in the left frontal region could be a tiny indolent meningioma of only 4 mm diameter. Negative noncontrast CT study. Specifically, there is no evidence of intracranial hemorrhage or mass effect, and no calvarial traumatic findings are noted. Xr Chest Portable    Result Date: 3/7/2020  Patient MRN:  [id number] : 1935 Age: 81 years Gender: Female Order Date:  3/7/2020 7:00 PM TECHNIQUE/NUMBER OF IMAGES/COMPARISON/CLINICAL HISTORY: Chest AP 1 image one view History new onset of altered mental status. FINDINGS: Heart has a normal size, mediastinum appears unremarkable. The lungs are clear, no infiltrates, consolidations or pleural effusions seen. There is no perihilar vascular congestion. No acute cardiopulmonary process. Us Carotid Artery Bilateral    Result Date: 3/8/2020  Patient MRN:  [id number] : 1935 Age: 81 years Gender: Female Order Date:  3/8/2020 8:45 AM EXAM: US CAROTID ARTERY BILATERAL NUMBER OF IMAGES:  70 INDICATION:  tia tia COMPARISON: None FINDINGS: Velocities are within normal limits. ICA\CCA ratios are within normal limits. The right vertebral artery doppler images demonstrate  antegrade flow. The left vertebral artery doppler images demonstrate  antegrade flow.  Grey scale images demonstrate mild plaque identified in the right and left carotid arteries. Atherosclerotic disease. No hemodynamically significant stenosis is identified Estimated stenosis by NASCET criteria in the proximal right carotid artery is between 0% and 49%. Estimated stenosis by NASCET criteria in the proximal left carotid artery is between 0% and 49%.    Activity: activity as tolerated  Diet: cardiac diet    Follow-up withme in 4 days    Note that over 30 minutes was spent in preparing discharge papers, discussing discharge with patient, medication review, etc.      Electronically signed by Yobani Romero MD on 3/9/2020 at 10:15 AM

## 2020-03-09 NOTE — CARE COORDINATION
Transition of care at discharge: Patient is lying in bed, alert and oriented. She states she lives alone and her daughter lives down the street from her. Her daughter helps but patient is independent. She drives, cooks, cleans, and performs ADLs without help. She does not have a history of inpatient rehab or homecare. She has had outpatient therapy in the past. Her pcp is Dr Hayley Bello and her pharmacy is [facility] in [place]. She does not anticipate any homegoing needs at discharge. Will follow.

## 2020-03-11 LAB — METHYLMALONIC ACID: 0.19 UMOL/L (ref 0–0.4)

## 2021-05-11 ENCOUNTER — HOSPITAL ENCOUNTER (OUTPATIENT)
Age: 86
Discharge: HOME | End: 2021-05-13

## 2021-05-11 LAB
ABO/RH: NORMAL
ANTIBODY SCREEN: NORMAL

## 2021-05-11 PROCEDURE — 87081 CULTURE SCREEN ONLY: CPT

## 2021-05-11 PROCEDURE — 86901 BLOOD TYPING SEROLOGIC RH(D): CPT

## 2021-05-11 PROCEDURE — 86900 BLOOD TYPING SEROLOGIC ABO: CPT

## 2021-05-11 PROCEDURE — 86850 RBC ANTIBODY SCREEN: CPT

## 2021-05-13 LAB — MRSA CULTURE ONLY: NORMAL

## 2021-05-20 ENCOUNTER — HOSPITAL ENCOUNTER (OUTPATIENT)
Age: 86
Discharge: HOME | End: 2021-05-22

## 2021-05-20 LAB
ANION GAP SERPL CALCULATED.3IONS-SCNC: 12 MMOL/L (ref 7–16)
BUN BLDV-MCNC: 13 MG/DL (ref 6–23)
CALCIUM SERPL-MCNC: 7.9 MG/DL (ref 8.6–10.2)
CHLORIDE BLD-SCNC: 103 MMOL/L (ref 98–107)
CO2: 22 MMOL/L (ref 22–29)
CREAT SERPL-MCNC: 0.6 MG/DL (ref 0.5–1)
GFR AFRICAN AMERICAN: >60
GFR NON-AFRICAN AMERICAN: >60 ML/MIN/1.73
GLUCOSE BLD-MCNC: 206 MG/DL (ref 74–99)
POTASSIUM SERPL-SCNC: 3.8 MMOL/L (ref 3.5–5)
SODIUM BLD-SCNC: 137 MMOL/L (ref 132–146)

## 2021-05-20 PROCEDURE — 85027 COMPLETE CBC AUTOMATED: CPT

## 2021-05-20 PROCEDURE — 80048 BASIC METABOLIC PNL TOTAL CA: CPT

## 2021-05-21 ENCOUNTER — HOSPITAL ENCOUNTER (OUTPATIENT)
Age: 86
Discharge: HOME | End: 2021-05-23

## 2021-05-21 LAB
ANION GAP SERPL CALCULATED.3IONS-SCNC: 11 MMOL/L (ref 7–16)
BUN BLDV-MCNC: 22 MG/DL (ref 6–23)
CALCIUM SERPL-MCNC: 8.9 MG/DL (ref 8.6–10.2)
CHLORIDE BLD-SCNC: 103 MMOL/L (ref 98–107)
CO2: 23 MMOL/L (ref 22–29)
CREAT SERPL-MCNC: 0.8 MG/DL (ref 0.5–1)
GFR AFRICAN AMERICAN: >60
GFR NON-AFRICAN AMERICAN: >60 ML/MIN/1.73
GLUCOSE BLD-MCNC: 132 MG/DL (ref 74–99)
HCT VFR BLD CALC: 30.6 % (ref 34–48)
HEMOGLOBIN: 9.8 G/DL (ref 11.5–15.5)
MCH RBC QN AUTO: 32.6 PG (ref 26–35)
MCHC RBC AUTO-ENTMCNC: 32 % (ref 32–34.5)
MCV RBC AUTO: 101.7 FL (ref 80–99.9)
PDW BLD-RTO: 13.6 FL (ref 11.5–15)
PLATELET # BLD: 167 E9/L (ref 130–450)
PMV BLD AUTO: 13.1 FL (ref 7–12)
POTASSIUM SERPL-SCNC: 3.8 MMOL/L (ref 3.5–5)
RBC # BLD: 3.01 E12/L (ref 3.5–5.5)
SODIUM BLD-SCNC: 137 MMOL/L (ref 132–146)
WBC # BLD: 15.5 E9/L (ref 4.5–11.5)

## 2021-05-21 PROCEDURE — 80048 BASIC METABOLIC PNL TOTAL CA: CPT

## 2021-05-21 PROCEDURE — 85027 COMPLETE CBC AUTOMATED: CPT

## 2021-07-08 LAB
HCT VFR BLD CALC: 32 % (ref 34–48)
HEMOGLOBIN: 10.5 G/DL (ref 11.5–15.5)
MCH RBC QN AUTO: 32.7 PG (ref 26–35)
MCHC RBC AUTO-ENTMCNC: 32.8 % (ref 32–34.5)
MCV RBC AUTO: 99.7 FL (ref 80–99.9)
PDW BLD-RTO: 13.2 FL (ref 11.5–15)
PLATELET # BLD: 163 E9/L (ref 130–450)
PMV BLD AUTO: 12.6 FL (ref 7–12)
RBC # BLD: 3.21 E12/L (ref 3.5–5.5)
WBC # BLD: 11.1 E9/L (ref 4.5–11.5)

## 2021-12-17 ENCOUNTER — HOSPITAL ENCOUNTER (OUTPATIENT)
Dept: INFUSION THERAPY | Age: 86
Setting detail: INFUSION SERIES
Discharge: HOME | End: 2021-12-17
Payer: MEDICARE

## 2021-12-17 VITALS
DIASTOLIC BLOOD PRESSURE: 88 MMHG | SYSTOLIC BLOOD PRESSURE: 168 MMHG | TEMPERATURE: 98.7 F | HEART RATE: 64 BPM | OXYGEN SATURATION: 99 % | RESPIRATION RATE: 14 BRPM

## 2021-12-17 PROCEDURE — M0243 CASIRIVI AND IMDEVI INFUSION: HCPCS

## 2021-12-17 PROCEDURE — 2580000003 HC RX 258: Performed by: INTERNAL MEDICINE

## 2021-12-17 PROCEDURE — 6360000002 HC RX W HCPCS: Performed by: INTERNAL MEDICINE

## 2021-12-17 RX ORDER — SODIUM CHLORIDE 9 MG/ML
100 INJECTION, SOLUTION INTRAVENOUS CONTINUOUS PRN
Status: CANCELLED | OUTPATIENT
Start: 2021-12-17

## 2021-12-17 RX ORDER — SODIUM CHLORIDE 0.9 % (FLUSH) 0.9 %
5-40 SYRINGE (ML) INJECTION PRN
Status: DISCONTINUED | OUTPATIENT
Start: 2021-12-17 | End: 2021-12-18 | Stop reason: HOSPADM

## 2021-12-17 RX ORDER — SODIUM CHLORIDE 0.9 % (FLUSH) 0.9 %
5-40 SYRINGE (ML) INJECTION EVERY 12 HOURS SCHEDULED
Status: CANCELLED | OUTPATIENT
Start: 2021-12-17

## 2021-12-17 RX ORDER — SODIUM CHLORIDE 9 MG/ML
25 INJECTION, SOLUTION INTRAVENOUS PRN
Status: CANCELLED | OUTPATIENT
Start: 2021-12-17

## 2021-12-17 RX ORDER — SODIUM CHLORIDE 9 MG/ML
INJECTION, SOLUTION INTRAVENOUS CONTINUOUS PRN
Status: CANCELLED | OUTPATIENT
Start: 2021-12-17 | End: 2021-12-17

## 2021-12-17 RX ORDER — EPINEPHRINE 1 MG/ML
0.3 INJECTION, SOLUTION, CONCENTRATE INTRAVENOUS PRN
Status: CANCELLED | OUTPATIENT
Start: 2021-12-17

## 2021-12-17 RX ORDER — SODIUM CHLORIDE 0.9 % (FLUSH) 0.9 %
5-40 SYRINGE (ML) INJECTION PRN
Status: CANCELLED | OUTPATIENT
Start: 2021-12-17

## 2021-12-17 RX ORDER — DIPHENHYDRAMINE HYDROCHLORIDE 50 MG/ML
50 INJECTION INTRAMUSCULAR; INTRAVENOUS
Status: CANCELLED | OUTPATIENT
Start: 2021-12-17 | End: 2021-12-17

## 2021-12-17 RX ORDER — METHYLPREDNISOLONE SODIUM SUCCINATE 125 MG/2ML
125 INJECTION, POWDER, LYOPHILIZED, FOR SOLUTION INTRAMUSCULAR; INTRAVENOUS
Status: CANCELLED | OUTPATIENT
Start: 2021-12-17 | End: 2021-12-17

## 2021-12-17 RX ORDER — SODIUM CHLORIDE 9 MG/ML
INJECTION, SOLUTION INTRAVENOUS CONTINUOUS PRN
Status: ACTIVE | OUTPATIENT
Start: 2021-12-17 | End: 2021-12-17

## 2021-12-17 RX ADMIN — Medication 600 MG: at 12:19

## 2021-12-17 RX ADMIN — SODIUM CHLORIDE: 9 INJECTION, SOLUTION INTRAVENOUS at 11:50

## 2021-12-17 RX ADMIN — SODIUM CHLORIDE, PRESERVATIVE FREE 10 ML: 5 INJECTION INTRAVENOUS at 14:08

## 2021-12-17 NOTE — PROGRESS NOTES
Pt given educational information on [employer]. All questions answered. Pt gives verbal consent to receive medications.

## 2023-07-04 ENCOUNTER — APPOINTMENT (OUTPATIENT)
Dept: CT IMAGING | Age: 88
DRG: 087 | End: 2023-07-04
Payer: MEDICARE

## 2023-07-04 ENCOUNTER — HOSPITAL ENCOUNTER (INPATIENT)
Age: 88
LOS: 2 days | Discharge: HOME | DRG: 087 | End: 2023-07-06
Attending: EMERGENCY MEDICINE | Admitting: SURGERY
Payer: MEDICARE

## 2023-07-04 ENCOUNTER — APPOINTMENT (OUTPATIENT)
Dept: GENERAL RADIOLOGY | Age: 88
DRG: 087 | End: 2023-07-04
Payer: MEDICARE

## 2023-07-04 DIAGNOSIS — S06.5XAA SUBDURAL HEMATOMA (HCC): Primary | ICD-10-CM
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
## 2023-07-04 DIAGNOSIS — S01.01XA LACERATION OF SCALP, INITIAL ENCOUNTER: ICD-10-CM

## 2023-07-04 DIAGNOSIS — S09.90XA CLOSED HEAD INJURY, INITIAL ENCOUNTER: ICD-10-CM

## 2023-07-04 LAB
ALBUMIN SERPL-MCNC: 4.2 G/DL (ref 3.5–5.2)
ALP SERPL-CCNC: 106 U/L (ref 35–104)
ALT SERPL-CCNC: 24 U/L (ref 0–32)
ANGLE (CLOT STRENGTH): 68.4 DEGREE (ref 59–74)
ANION GAP SERPL CALCULATED.3IONS-SCNC: 9 MMOL/L (ref 7–16)
APTT BLD: 27 SEC (ref 24.5–35.1)
AST SERPL-CCNC: 29 U/L (ref 0–31)
BASOPHILS # BLD: 0.02 E9/L (ref 0–0.2)
BASOPHILS NFR BLD: 0.2 % (ref 0–2)
BILIRUB SERPL-MCNC: 0.4 MG/DL (ref 0–1.2)
BUN SERPL-MCNC: 17 MG/DL (ref 6–23)
CALCIUM SERPL-MCNC: 9.1 MG/DL (ref 8.6–10.2)
CHLORIDE SERPL-SCNC: 110 MMOL/L (ref 98–107)
CO2 SERPL-SCNC: 24 MMOL/L (ref 22–29)
CREAT SERPL-MCNC: 0.8 MG/DL (ref 0.5–1)
EOSINOPHIL # BLD: 0.06 E9/L (ref 0.05–0.5)
EOSINOPHIL NFR BLD: 0.7 % (ref 0–6)
EPL-TEG: 1.6 % (ref 0–15)
ERYTHROCYTE [DISTWIDTH] IN BLOOD BY AUTOMATED COUNT: 14.2 FL (ref 11.5–15)
ETHANOLAMINE SERPL-MCNC: <10 MG/DL (ref 0–0.08)
G-TEG: 8.9 K D/SC (ref 4.5–11)
GLUCOSE SERPL-MCNC: 110 MG/DL (ref 74–99)
HCT VFR BLD AUTO: 38.7 % (ref 34–48)
HGB BLD-MCNC: 12.5 G/DL (ref 11.5–15.5)
IMM GRANULOCYTES # BLD: 0.02 E9/L
IMM GRANULOCYTES NFR BLD: 0.2 % (ref 0–5)
INR BLD: 1.1
K (CLOTTING TIME): 1.5 MIN (ref 1–3)
LY30 (FIBRINOLYSIS): 1.6 % (ref 0–8)
LYMPHOCYTES # BLD: 1.88 E9/L (ref 1.5–4)
LYMPHOCYTES NFR BLD: 20.6 % (ref 20–42)
MA (MAX AMPLITUDE): 63.9 MM (ref 50–70)
MCH RBC QN AUTO: 33 PG (ref 26–35)
MCHC RBC AUTO-ENTMCNC: 32.3 % (ref 32–34.5)
MCV RBC AUTO: 102.1 FL (ref 80–99.9)
MONOCYTES # BLD: 0.69 E9/L (ref 0.1–0.95)
MONOCYTES NFR BLD: 7.6 % (ref 2–12)
NEUTROPHILS # BLD: 6.46 E9/L (ref 1.8–7.3)
NEUTS SEG NFR BLD: 70.7 % (ref 43–80)
PLATELET # BLD AUTO: 173 E9/L (ref 130–450)
PMV BLD AUTO: 11.5 FL (ref 7–12)
POTASSIUM SERPL-SCNC: 4.1 MMOL/L (ref 3.5–5)
PROT SERPL-MCNC: 7.8 G/DL (ref 6.4–8.3)
PROTHROMBIN TIME: 12.3 SEC (ref 9.3–12.4)
R (REACTION TIME): 4.1 MIN (ref 5–10)
RBC # BLD AUTO: 3.79 E12/L (ref 3.5–5.5)
SODIUM SERPL-SCNC: 143 MMOL/L (ref 132–146)
WBC # BLD: 9.1 E9/L (ref 4.5–11.5)

## 2023-07-04 PROCEDURE — 2060000000 HC ICU INTERMEDIATE R&B

## 2023-07-04 PROCEDURE — 0HQ0XZZ REPAIR SCALP SKIN, EXTERNAL APPROACH: ICD-10-PCS | Performed by: PHYSICIAN ASSISTANT

## 2023-07-04 PROCEDURE — 93005 ELECTROCARDIOGRAM TRACING: CPT | Performed by: EMERGENCY MEDICINE

## 2023-07-04 PROCEDURE — 72125 CT NECK SPINE W/O DYE: CPT

## 2023-07-04 PROCEDURE — 71250 CT THORAX DX C-: CPT

## 2023-07-04 PROCEDURE — 2580000003 HC RX 258: Performed by: EMERGENCY MEDICINE

## 2023-07-04 PROCEDURE — 85610 PROTHROMBIN TIME: CPT

## 2023-07-04 PROCEDURE — 70450 CT HEAD/BRAIN W/O DYE: CPT

## 2023-07-04 PROCEDURE — 2500000003 HC RX 250 WO HCPCS: Performed by: PHYSICIAN ASSISTANT

## 2023-07-04 PROCEDURE — 72128 CT CHEST SPINE W/O DYE: CPT

## 2023-07-04 PROCEDURE — 85576 BLOOD PLATELET AGGREGATION: CPT

## 2023-07-04 PROCEDURE — 90471 IMMUNIZATION ADMIN: CPT | Performed by: PHYSICIAN ASSISTANT

## 2023-07-04 PROCEDURE — 80053 COMPREHEN METABOLIC PANEL: CPT

## 2023-07-04 PROCEDURE — 85384 FIBRINOGEN ACTIVITY: CPT

## 2023-07-04 PROCEDURE — 85347 COAGULATION TIME ACTIVATED: CPT

## 2023-07-04 PROCEDURE — 6360000002 HC RX W HCPCS: Performed by: EMERGENCY MEDICINE

## 2023-07-04 PROCEDURE — 96365 THER/PROPH/DIAG IV INF INIT: CPT

## 2023-07-04 PROCEDURE — 96375 TX/PRO/DX INJ NEW DRUG ADDON: CPT

## 2023-07-04 PROCEDURE — 73110 X-RAY EXAM OF WRIST: CPT

## 2023-07-04 PROCEDURE — 12002 RPR S/N/AX/GEN/TRNK2.6-7.5CM: CPT

## 2023-07-04 PROCEDURE — 85730 THROMBOPLASTIN TIME PARTIAL: CPT

## 2023-07-04 PROCEDURE — 72192 CT PELVIS W/O DYE: CPT

## 2023-07-04 PROCEDURE — 85025 COMPLETE CBC W/AUTO DIFF WBC: CPT

## 2023-07-04 PROCEDURE — 99285 EMERGENCY DEPT VISIT HI MDM: CPT

## 2023-07-04 PROCEDURE — 96374 THER/PROPH/DIAG INJ IV PUSH: CPT

## 2023-07-04 PROCEDURE — 72131 CT LUMBAR SPINE W/O DYE: CPT

## 2023-07-04 PROCEDURE — 6370000000 HC RX 637 (ALT 250 FOR IP)

## 2023-07-04 PROCEDURE — 0WQ00ZZ REPAIR HEAD, OPEN APPROACH: ICD-10-PCS | Performed by: EMERGENCY MEDICINE

## 2023-07-04 PROCEDURE — 90714 TD VACC NO PRESV 7 YRS+ IM: CPT | Performed by: PHYSICIAN ASSISTANT

## 2023-07-04 PROCEDURE — 6360000002 HC RX W HCPCS: Performed by: PHYSICIAN ASSISTANT

## 2023-07-04 PROCEDURE — 82077 ASSAY SPEC XCP UR&BREATH IA: CPT

## 2023-07-04 RX ORDER — DONEPEZIL HYDROCHLORIDE 5 MG/1
10 TABLET, FILM COATED ORAL DAILY
Status: DISCONTINUED | OUTPATIENT
Start: 2023-07-05 | End: 2023-07-06 | Stop reason: HOSPADM

## 2023-07-04 RX ORDER — BUTALBITAL, ACETAMINOPHEN AND CAFFEINE 50; 325; 40 MG/1; MG/1; MG/1
1 TABLET ORAL EVERY 4 HOURS PRN
Status: DISCONTINUED | OUTPATIENT
Start: 2023-07-04 | End: 2023-07-06 | Stop reason: HOSPADM

## 2023-07-04 RX ORDER — SODIUM CHLORIDE 9 MG/ML
INJECTION, SOLUTION INTRAVENOUS PRN
Status: DISCONTINUED | OUTPATIENT
Start: 2023-07-04 | End: 2023-07-06 | Stop reason: HOSPADM

## 2023-07-04 RX ORDER — LOSARTAN POTASSIUM 25 MG/1
25 TABLET ORAL DAILY
Status: DISCONTINUED | OUTPATIENT
Start: 2023-07-05 | End: 2023-07-06 | Stop reason: HOSPADM

## 2023-07-04 RX ORDER — LEVETIRACETAM 500 MG/1
500 TABLET ORAL 2 TIMES DAILY
Status: DISCONTINUED | OUTPATIENT
Start: 2023-07-05 | End: 2023-07-06 | Stop reason: HOSPADM

## 2023-07-04 RX ORDER — LEVETIRACETAM 500 MG/5ML
1000 INJECTION, SOLUTION, CONCENTRATE INTRAVENOUS ONCE
Status: COMPLETED | OUTPATIENT
Start: 2023-07-04 | End: 2023-07-04

## 2023-07-04 RX ORDER — SODIUM CHLORIDE 0.9 % (FLUSH) 0.9 %
10 SYRINGE (ML) INJECTION EVERY 12 HOURS SCHEDULED
Status: DISCONTINUED | OUTPATIENT
Start: 2023-07-04 | End: 2023-07-06 | Stop reason: HOSPADM

## 2023-07-04 RX ORDER — LIDOCAINE HYDROCHLORIDE AND EPINEPHRINE 10; 10 MG/ML; UG/ML
20 INJECTION, SOLUTION INFILTRATION; PERINEURAL ONCE
Status: COMPLETED | OUTPATIENT
Start: 2023-07-04 | End: 2023-07-04

## 2023-07-04 RX ORDER — SODIUM CHLORIDE 0.9 % (FLUSH) 0.9 %
10 SYRINGE (ML) INJECTION PRN
Status: DISCONTINUED | OUTPATIENT
Start: 2023-07-04 | End: 2023-07-06 | Stop reason: HOSPADM

## 2023-07-04 RX ORDER — LABETALOL HYDROCHLORIDE 5 MG/ML
10 INJECTION, SOLUTION INTRAVENOUS
Status: DISCONTINUED | OUTPATIENT
Start: 2023-07-04 | End: 2023-07-06 | Stop reason: HOSPADM

## 2023-07-04 RX ORDER — ATORVASTATIN CALCIUM 10 MG/1
10 TABLET, FILM COATED ORAL EVERY OTHER DAY
Status: DISCONTINUED | OUTPATIENT
Start: 2023-07-05 | End: 2023-07-06 | Stop reason: HOSPADM

## 2023-07-04 RX ORDER — ESCITALOPRAM OXALATE 5 MG/1
5 TABLET ORAL DAILY
Status: DISCONTINUED | OUTPATIENT
Start: 2023-07-05 | End: 2023-07-06 | Stop reason: HOSPADM

## 2023-07-04 RX ORDER — LEVOTHYROXINE SODIUM 0.03 MG/1
50 TABLET ORAL DAILY
Status: DISCONTINUED | OUTPATIENT
Start: 2023-07-05 | End: 2023-07-06 | Stop reason: HOSPADM

## 2023-07-04 RX ORDER — ONDANSETRON 4 MG/1
4 TABLET, ORALLY DISINTEGRATING ORAL EVERY 8 HOURS PRN
Status: DISCONTINUED | OUTPATIENT
Start: 2023-07-04 | End: 2023-07-06 | Stop reason: HOSPADM

## 2023-07-04 RX ORDER — TETANUS AND DIPHTHERIA TOXOIDS ADSORBED 2; 2 [LF]/.5ML; [LF]/.5ML
0.5 INJECTION INTRAMUSCULAR ONCE
Status: COMPLETED | OUTPATIENT
Start: 2023-07-04 | End: 2023-07-04

## 2023-07-04 RX ORDER — FENTANYL CITRATE 50 UG/ML
25 INJECTION, SOLUTION INTRAMUSCULAR; INTRAVENOUS ONCE
Status: COMPLETED | OUTPATIENT
Start: 2023-07-04 | End: 2023-07-04

## 2023-07-04 RX ORDER — HYDRALAZINE HYDROCHLORIDE 20 MG/ML
10 INJECTION INTRAMUSCULAR; INTRAVENOUS
Status: DISCONTINUED | OUTPATIENT
Start: 2023-07-04 | End: 2023-07-06 | Stop reason: HOSPADM

## 2023-07-04 RX ORDER — POLYETHYLENE GLYCOL 3350 17 G/17G
17 POWDER, FOR SOLUTION ORAL DAILY
Status: DISCONTINUED | OUTPATIENT
Start: 2023-07-05 | End: 2023-07-06 | Stop reason: HOSPADM

## 2023-07-04 RX ORDER — HYDRALAZINE HYDROCHLORIDE 20 MG/ML
10 INJECTION INTRAMUSCULAR; INTRAVENOUS ONCE
Status: COMPLETED | OUTPATIENT
Start: 2023-07-04 | End: 2023-07-04

## 2023-07-04 RX ORDER — ONDANSETRON 2 MG/ML
4 INJECTION INTRAMUSCULAR; INTRAVENOUS EVERY 6 HOURS PRN
Status: DISCONTINUED | OUTPATIENT
Start: 2023-07-04 | End: 2023-07-06 | Stop reason: HOSPADM

## 2023-07-04 RX ORDER — MEMANTINE HYDROCHLORIDE 10 MG/1
10 TABLET ORAL 2 TIMES DAILY
Status: DISCONTINUED | OUTPATIENT
Start: 2023-07-04 | End: 2023-07-06 | Stop reason: HOSPADM

## 2023-07-04 RX ADMIN — DESMOPRESSIN ACETATE 24 MCG: 4 INJECTION, SOLUTION INTRAVENOUS; SUBCUTANEOUS at 20:58

## 2023-07-04 RX ADMIN — TETANUS AND DIPHTHERIA TOXOIDS ADSORBED 0.5 ML: 2; 2 INJECTION INTRAMUSCULAR at 17:00

## 2023-07-04 RX ADMIN — LIDOCAINE HYDROCHLORIDE AND EPINEPHRINE 20 ML: 10; 10 INJECTION, SOLUTION INFILTRATION; PERINEURAL at 18:32

## 2023-07-04 RX ADMIN — HYDRALAZINE HYDROCHLORIDE 10 MG: 20 INJECTION INTRAMUSCULAR; INTRAVENOUS at 19:54

## 2023-07-04 RX ADMIN — BUTALBITAL, ACETAMINOPHEN, AND CAFFEINE 1 TABLET: 50; 325; 40 TABLET ORAL at 22:29

## 2023-07-04 RX ADMIN — FENTANYL CITRATE 25 MCG: 50 INJECTION INTRAMUSCULAR; INTRAVENOUS at 16:52

## 2023-07-04 RX ADMIN — LEVETIRACETAM 1000 MG: 100 INJECTION INTRAVENOUS at 20:01

## 2023-07-04 NOTE — ED PROVIDER NOTES
Shared HIGINIO-ED Attending Visit. CC: 35 min       [address]  Department of Emergency Medicine   ED  Encounter Note  Admit Date/RoomTime: 2023  4:25 PM  ED Room: [facility]    NAME: Ute Pinedo  : 1935  MRN: [id number]     Chief Complaint:  Fall (Mechanical fall fell backwards after missed step. -LOC - thinners hx dementia. Patient baseline per daughter) and Head Laceration    History of Present Illness       Ute Pinedo is a 81 y.o. old female who presents to the emergency department by ambulance, for a mechanical fall which occured 1 hour(s) prior to arrival. She reportedly \"was walking up 1 step at my daughter's house and lost her balance fell backwards landing on my butt and then the back of my head on the cement, I did not lose consciousness. \"  This was witnessed by her daughter and her . Patient states she was having no symptoms beforehand such as headaches, blurry vision, chest pain, shortness breath, fever, chills, nausea, vomiting, severe abdominal pain. Patient is not on any blood thinners. Patient's tetanus is unknown. Patient states she has pain to the back of her head as well as her right wrist.  Otherwise patient states her pain is unremarkable. Pain is aggravated by palpation of the head area and movement of the right wrist including flexion and extension. Relieved by being still. She takes no blood thinning agents. .  ROS   Pertinent positives and negatives are stated within HPI, all other systems reviewed and are negative. Past Medical History:  has a past medical history of Arthritis, Dementia ([address]), Hyperlipidemia, and Hypothyroid. Surgical History:  has a past surgical history that includes Hysterectomy and Bunionectomy. Social History:  reports that she has never smoked. She has never used smokeless tobacco. She reports current alcohol use of about 7.0 standard drinks per week. She reports that she does not use drugs.     Family History:

## 2023-07-05 LAB
AMPHET UR QL SCN: NOT DETECTED
ANION GAP SERPL CALCULATED.3IONS-SCNC: 8 MMOL/L (ref 7–16)
BARBITURATES UR QL SCN: POSITIVE
BASOPHILS # BLD: 0.01 E9/L (ref 0–0.2)
BASOPHILS NFR BLD: 0.1 % (ref 0–2)
BENZODIAZ UR QL SCN: NOT DETECTED
BUN SERPL-MCNC: 16 MG/DL (ref 6–23)
CALCIUM SERPL-MCNC: 8.6 MG/DL (ref 8.6–10.2)
CANNABINOIDS UR QL SCN: NOT DETECTED
CHLORIDE SERPL-SCNC: 109 MMOL/L (ref 98–107)
CO2 SERPL-SCNC: 24 MMOL/L (ref 22–29)
COCAINE UR QL SCN: NOT DETECTED
CREAT SERPL-MCNC: 0.6 MG/DL (ref 0.5–1)
DRUG SCREEN COMMENT UR-IMP: ABNORMAL
EOSINOPHIL # BLD: 0.08 E9/L (ref 0.05–0.5)
EOSINOPHIL NFR BLD: 1.1 % (ref 0–6)
ERYTHROCYTE [DISTWIDTH] IN BLOOD BY AUTOMATED COUNT: 14.2 FL (ref 11.5–15)
FENTANYL SCREEN, URINE: NOT DETECTED
GLUCOSE SERPL-MCNC: 105 MG/DL (ref 74–99)
HCT VFR BLD AUTO: 35 % (ref 34–48)
HGB BLD-MCNC: 11 G/DL (ref 11.5–15.5)
IMM GRANULOCYTES # BLD: 0.02 E9/L
IMM GRANULOCYTES NFR BLD: 0.3 % (ref 0–5)
LYMPHOCYTES # BLD: 1.96 E9/L (ref 1.5–4)
LYMPHOCYTES NFR BLD: 27.6 % (ref 20–42)
MCH RBC QN AUTO: 32.6 PG (ref 26–35)
MCHC RBC AUTO-ENTMCNC: 31.4 % (ref 32–34.5)
MCV RBC AUTO: 103.9 FL (ref 80–99.9)
METHADONE UR QL SCN: NOT DETECTED
MONOCYTES # BLD: 0.59 E9/L (ref 0.1–0.95)
MONOCYTES NFR BLD: 8.3 % (ref 2–12)
NEUTROPHILS # BLD: 4.45 E9/L (ref 1.8–7.3)
NEUTS SEG NFR BLD: 62.6 % (ref 43–80)
OPIATES UR QL SCN: NOT DETECTED
OXYCODONE URINE: NOT DETECTED
PCP UR QL SCN: NOT DETECTED
PLATELET # BLD AUTO: 149 E9/L (ref 130–450)
PMV BLD AUTO: 12 FL (ref 7–12)
POTASSIUM SERPL-SCNC: 4 MMOL/L (ref 3.5–5)
RBC # BLD AUTO: 3.37 E12/L (ref 3.5–5.5)
SODIUM SERPL-SCNC: 141 MMOL/L (ref 132–146)
WBC # BLD: 7.1 E9/L (ref 4.5–11.5)

## 2023-07-05 PROCEDURE — 6370000000 HC RX 637 (ALT 250 FOR IP)

## 2023-07-05 PROCEDURE — 97161 PT EVAL LOW COMPLEX 20 MIN: CPT

## 2023-07-05 PROCEDURE — 2060000000 HC ICU INTERMEDIATE R&B

## 2023-07-05 PROCEDURE — 80307 DRUG TEST PRSMV CHEM ANLYZR: CPT

## 2023-07-05 PROCEDURE — 99223 1ST HOSP IP/OBS HIGH 75: CPT | Performed by: SURGERY

## 2023-07-05 PROCEDURE — 6360000002 HC RX W HCPCS

## 2023-07-05 PROCEDURE — 85025 COMPLETE CBC W/AUTO DIFF WBC: CPT

## 2023-07-05 PROCEDURE — 99222 1ST HOSP IP/OBS MODERATE 55: CPT | Performed by: NEUROLOGICAL SURGERY

## 2023-07-05 PROCEDURE — 2580000003 HC RX 258

## 2023-07-05 PROCEDURE — 80048 BASIC METABOLIC PNL TOTAL CA: CPT

## 2023-07-05 RX ORDER — METOPROLOL SUCCINATE 25 MG/1
25 TABLET, EXTENDED RELEASE ORAL DAILY
Status: DISCONTINUED | OUTPATIENT
Start: 2023-07-05 | End: 2023-07-06 | Stop reason: HOSPADM

## 2023-07-05 RX ORDER — FOLIC ACID 1 MG/1
2 TABLET ORAL DAILY
Status: DISCONTINUED | OUTPATIENT
Start: 2023-07-05 | End: 2023-07-06 | Stop reason: HOSPADM

## 2023-07-05 RX ORDER — METOPROLOL SUCCINATE 25 MG/1
25 TABLET, EXTENDED RELEASE ORAL DAILY
COMMUNITY

## 2023-07-05 RX ORDER — ACETAMINOPHEN 325 MG/1
650 TABLET ORAL EVERY 4 HOURS PRN
Status: DISCONTINUED | OUTPATIENT
Start: 2023-07-05 | End: 2023-07-06 | Stop reason: HOSPADM

## 2023-07-05 RX ADMIN — HYDRALAZINE HYDROCHLORIDE 10 MG: 20 INJECTION INTRAMUSCULAR; INTRAVENOUS at 05:59

## 2023-07-05 RX ADMIN — LEVETIRACETAM 500 MG: 500 TABLET, FILM COATED ORAL at 21:06

## 2023-07-05 RX ADMIN — MEMANTINE 10 MG: 10 TABLET ORAL at 21:06

## 2023-07-05 RX ADMIN — BUTALBITAL, ACETAMINOPHEN, AND CAFFEINE 1 TABLET: 50; 325; 40 TABLET ORAL at 14:55

## 2023-07-05 RX ADMIN — LOSARTAN POTASSIUM 25 MG: 50 TABLET, FILM COATED ORAL at 10:31

## 2023-07-05 RX ADMIN — LEVETIRACETAM 500 MG: 500 TABLET, FILM COATED ORAL at 10:31

## 2023-07-05 RX ADMIN — SODIUM CHLORIDE, PRESERVATIVE FREE 10 ML: 5 INJECTION INTRAVENOUS at 21:06

## 2023-07-05 RX ADMIN — ATORVASTATIN CALCIUM 10 MG: 10 TABLET, FILM COATED ORAL at 10:31

## 2023-07-05 ASSESSMENT — PAIN DESCRIPTION - LOCATION
LOCATION: BACK;HEAD
LOCATION: HEAD

## 2023-07-05 ASSESSMENT — PAIN SCALES - GENERAL
PAINLEVEL_OUTOF10: 8
PAINLEVEL_OUTOF10: 0
PAINLEVEL_OUTOF10: 3

## 2023-07-05 ASSESSMENT — PAIN DESCRIPTION - ORIENTATION: ORIENTATION: POSTERIOR

## 2023-07-05 ASSESSMENT — PAIN DESCRIPTION - DESCRIPTORS: DESCRIPTORS: TENDER;SORE;DISCOMFORT

## 2023-07-05 NOTE — CONSULTS
Consult      PCP: Yenny Armijo MD    Date of Admission: 7/4/2023    Date of Service: Pt seen/examined on 7/5/2023 and is     Chief Complaint:  had concerns including Fall (Mechanical fall fell backwards after missed step. -LOC - thinners hx dementia. Patient baseline per daughter) and Head Laceration. History Of Present Illness:    Ms. Brock Pepe, a 81y.o. year old female  who  has a past medical history of Arthritis, Dementia ([address]), Hyperlipidemia, and Hypothyroid. 26-year-old female with dementia and also with a history of hyperlipidemia and hypertension  She is also on medications for rheumatoid arthritis. She lives with her daughter. She is had a nonsyncopal fall in which she tripped yesterday and hit her head  Currently lost her footing and fell backwards. She did have a posterior scalp laceration. She was given appropriate stapling. She is now here for further monitoring. Complaining of a headache and she has had some up-and-down blood pressure readings        Past Medical History:        Diagnosis Date    Arthritis     Dementia ([address])     Hyperlipidemia     Hypothyroid        Past Surgical History:        Procedure Laterality Date    BUNIONECTOMY      HYSTERECTOMY (CERVIX STATUS UNKNOWN)         Medications Prior to Admission:      Prior to Admission medications    Medication Sig Start Date End Date Taking?  Authorizing Provider   methotrexate (RHEUMATREX) 2.5 MG chemo tablet Take 6 tablets by mouth once a week Given Wednesday   Yes Historical Provider, MD   metoprolol succinate (TOPROL XL) 25 MG extended release tablet Take 1 tablet by mouth daily   Yes Historical Provider, MD   losartan (COZAAR) 25 MG tablet Take 1 tablet by mouth daily 3/10/20   Yenny Armijo MD   folic acid (FOLVITE) 1 MG tablet Take 2 tablets by mouth daily 8/13/19   Ezio Kaur MD   aspirin 81 MG EC tablet Take 1 tablet by mouth daily    Historical Provider, MD   escitalopram (LEXAPRO) 5 MG tablet Take 1 tablet by mouth

## 2023-07-05 NOTE — CONSULTS
[address], [address] [place] [address]                                  CONSULTATION    PATIENT NAME: Edwige Flaherty                        :        1935  MED REC NO:   [id number]                            ROOM:       11  ACCOUNT NO:   [de-identified]                           ADMIT DATE: 2023  PROVIDER:     Dafne Duke MD    CONSULT DATE:  2023    REASON FOR CONSULTATION:  Subdural hematoma. HISTORY OF PRESENT ILLNESS:  The patient is an 81-year-old lady with a  past medical history of dementia, hyperlipidemia, and hypertension, who  apparently fell and hit the back of her head yesterday. She did have an  occipital laceration. She was ultimately brought to [facility] for further evaluation and management. This laceration was  stapled. CT scan of her head showed parafalcine subdural hematoma and  the left frontal subdural hematoma. Of note, she was on aspirin. At  this time, she complains of a headache, but denies any new numbness,  tingling, or weakness or loss of control of bowel or bladder function. PAST MEDICAL HISTORY:  Positive for dementia, hyperlipidemia, and  hypertension. PAST SURGICAL HISTORY:  Positive for hip surgery. SOCIAL HISTORY:  Negative for tobacco or alcohol use. ALLERGIES:  Include SULFA. FAMILY HISTORY:  Positive for diabetes in her father. HOME MEDICATIONS:  Include aspirin. REVIEW OF SYSTEMS:  HEENT:  Positive for headache, but negative for  double vision or blurry vision. Cardiovascular:  Negative for chest  pain, arrhythmia, or palpitations. Respiratory:  Negative for shortness  of breath, asthma, bronchitis, or pneumonia. Gastrointestinal:   Negative for heartburn, nausea, vomiting, diarrhea, or constipation. Genitourinary:  Negative for hematuria or dysuria. Hematologic:   Positive for easy bleeding or bruising.   Infectious:  Negative for

## 2023-07-05 NOTE — H&P
History:   Tobacco use:  none  Alcohol use:  none  Illicit drug use:  no history of illicit drug use    Past Surgical History:  hysterectomy     Anticoagulant use: None  Antiplatelet use:   ASA    NSAID use in last 72 hours: no  Taken PCN in past:  yes  Last food/drink: 7/4  Last tetanus: unsure      Family History:   No family history of anesthesia complications    Complaints:   Head: Moderate  Neck:   None  Chest:   None  Back:   None  Abdomen:   None  Extremities:   None  Comments:     Review of systems:  All negative unless otherwise noted. SECONDARY SURVEY  Head/scalp: posterior scalp laceration s/p repair        Face: Atraumatic    Eyes/ears/nose: Atraumatic      Pharynx/mouth: Atraumatic      Neck:  Atraumatic      Cervical spine tenderness:  Cervical collar not indicated  Pain:  none  ROM:  Not indicated     Chest wall:   Atraumatic       Heart:   Regular rate & rhythm    Abdomen:  Atraumatic. Soft ND  Tenderness:  none    Pelvis: Atraumatic      Tenderness: none    Thoracolumbar spine: Atraumatic     Tenderness:  none    Genitourinary:  Atraumatic. No blood or urine noted    Rectum: Atraumatic. No blood noted. Perineum: Atraumatic. No blood or urine noted. Extremities:   Sensory normal  Motor normal    Distal Pulses  Left arm normal  Right arm normal  Left leg normal  Right leg normal    Capillary refill  Left arm normal  Right arm normal  Left leg normal  Right leg normal    Procedures in ED:      In the event of Emergency Blood Transfusion:  Due to the critical condition of this patient, I request the immediate release of blood products for emergency transfusion secondary to shock. I understand the increased risks incurred by the lack of complete transfusion testing.       Radiology: Chest Xray, Pelvic Xray, Ct head, Ct cervical spine, CT chest, CT abdomen, CT thoracic, CT lumbar      Consultations:   NSG   Admission/Diagnosis:      Plan of Treatment:  Admit to tele   Repeat CT head

## 2023-07-05 NOTE — ED NOTES
Pt assisted back to bed HOB >30 family at bedside and call bell within reach, pt instructed to stay in  bed, pure wick explained but not paced at this time      Quynh Scanlon RN  07/05/23 2889

## 2023-07-05 NOTE — DISCHARGE INSTRUCTIONS
use recreational drugs as they may make you feel worse and/or hide the warning signs. Follow-up:  Trauma Clinic: [phone] -- press option 2   [address] , [address] FOLLOW UP INSTRUCTIONS      Your follow up appointment at the [address] [place] [address] is scheduled for:    Tuesday, July 18, 2023  10 AM    Please call if you need to reschedule your appointment.    [phone]    Where we are located:    [address] [facility], [address] [facility]    The [address] [place] [address] is in the [address] on [facility]. Turn down the street with the parking garage, [employer]. Go past the garage and make the first right at the stop sign. The building is on the right-hand side. The Clinic is on the 2nd floor, station F.     Parking Instructions: The parking lot is next the building on the corner of [address] [place] [address]. Handicap parking is located in the drive up wrap in front of our building.

## 2023-07-06 VITALS
OXYGEN SATURATION: 98 % | WEIGHT: 130 LBS | DIASTOLIC BLOOD PRESSURE: 68 MMHG | TEMPERATURE: 97.4 F | RESPIRATION RATE: 16 BRPM | HEART RATE: 54 BPM | BODY MASS INDEX: 22.2 KG/M2 | SYSTOLIC BLOOD PRESSURE: 124 MMHG | HEIGHT: 64 IN

## 2023-07-06 LAB
ANION GAP SERPL CALCULATED.3IONS-SCNC: 11 MMOL/L (ref 7–16)
BASOPHILS # BLD: 0.02 E9/L (ref 0–0.2)
BASOPHILS NFR BLD: 0.3 % (ref 0–2)
BUN SERPL-MCNC: 16 MG/DL (ref 6–23)
CALCIUM SERPL-MCNC: 8.5 MG/DL (ref 8.6–10.2)
CHLORIDE SERPL-SCNC: 104 MMOL/L (ref 98–107)
CO2 SERPL-SCNC: 22 MMOL/L (ref 22–29)
CREAT SERPL-MCNC: 0.7 MG/DL (ref 0.5–1)
EKG ATRIAL RATE: 59 BPM
EKG P AXIS: 56 DEGREES
EKG P-R INTERVAL: 154 MS
EKG Q-T INTERVAL: 428 MS
EKG QRS DURATION: 90 MS
EKG QTC CALCULATION (BAZETT): 423 MS
EKG R AXIS: -22 DEGREES
EKG T AXIS: 56 DEGREES
EKG VENTRICULAR RATE: 59 BPM
EOSINOPHIL # BLD: 0.13 E9/L (ref 0.05–0.5)
EOSINOPHIL NFR BLD: 1.9 % (ref 0–6)
ERYTHROCYTE [DISTWIDTH] IN BLOOD BY AUTOMATED COUNT: 14 FL (ref 11.5–15)
GLUCOSE SERPL-MCNC: 96 MG/DL (ref 74–99)
HCT VFR BLD AUTO: 33.7 % (ref 34–48)
HGB BLD-MCNC: 11.1 G/DL (ref 11.5–15.5)
IMM GRANULOCYTES # BLD: 0.03 E9/L
IMM GRANULOCYTES NFR BLD: 0.4 % (ref 0–5)
LYMPHOCYTES # BLD: 1.99 E9/L (ref 1.5–4)
LYMPHOCYTES NFR BLD: 29 % (ref 20–42)
MAGNESIUM SERPL-MCNC: 1.9 MG/DL (ref 1.6–2.6)
MCH RBC QN AUTO: 32.7 PG (ref 26–35)
MCHC RBC AUTO-ENTMCNC: 32.9 % (ref 32–34.5)
MCV RBC AUTO: 99.4 FL (ref 80–99.9)
MONOCYTES # BLD: 0.64 E9/L (ref 0.1–0.95)
MONOCYTES NFR BLD: 9.3 % (ref 2–12)
NEUTROPHILS # BLD: 4.06 E9/L (ref 1.8–7.3)
NEUTS SEG NFR BLD: 59.1 % (ref 43–80)
PLATELET # BLD AUTO: 154 E9/L (ref 130–450)
PMV BLD AUTO: 12.3 FL (ref 7–12)
POTASSIUM SERPL-SCNC: 3 MMOL/L (ref 3.5–5)
RBC # BLD AUTO: 3.39 E12/L (ref 3.5–5.5)
SODIUM SERPL-SCNC: 137 MMOL/L (ref 132–146)
WBC # BLD: 6.9 E9/L (ref 4.5–11.5)

## 2023-07-06 PROCEDURE — 99232 SBSQ HOSP IP/OBS MODERATE 35: CPT | Performed by: SURGERY

## 2023-07-06 PROCEDURE — 6360000002 HC RX W HCPCS: Performed by: STUDENT IN AN ORGANIZED HEALTH CARE EDUCATION/TRAINING PROGRAM

## 2023-07-06 PROCEDURE — 80048 BASIC METABOLIC PNL TOTAL CA: CPT

## 2023-07-06 PROCEDURE — 93010 ELECTROCARDIOGRAM REPORT: CPT | Performed by: INTERNAL MEDICINE

## 2023-07-06 PROCEDURE — 85025 COMPLETE CBC W/AUTO DIFF WBC: CPT

## 2023-07-06 PROCEDURE — 2580000003 HC RX 258

## 2023-07-06 PROCEDURE — 6370000000 HC RX 637 (ALT 250 FOR IP): Performed by: INTERNAL MEDICINE

## 2023-07-06 PROCEDURE — 36415 COLL VENOUS BLD VENIPUNCTURE: CPT

## 2023-07-06 PROCEDURE — 97165 OT EVAL LOW COMPLEX 30 MIN: CPT

## 2023-07-06 PROCEDURE — 6370000000 HC RX 637 (ALT 250 FOR IP)

## 2023-07-06 PROCEDURE — 6370000000 HC RX 637 (ALT 250 FOR IP): Performed by: STUDENT IN AN ORGANIZED HEALTH CARE EDUCATION/TRAINING PROGRAM

## 2023-07-06 PROCEDURE — 97535 SELF CARE MNGMENT TRAINING: CPT

## 2023-07-06 PROCEDURE — 99232 SBSQ HOSP IP/OBS MODERATE 35: CPT | Performed by: NEUROLOGICAL SURGERY

## 2023-07-06 PROCEDURE — 83735 ASSAY OF MAGNESIUM: CPT

## 2023-07-06 RX ORDER — LEVETIRACETAM 500 MG/1
500 TABLET ORAL 2 TIMES DAILY
Qty: 11 TABLET | Refills: 0 | Status: SHIPPED | OUTPATIENT
Start: 2023-07-06 | End: 2023-07-06 | Stop reason: SDUPTHER

## 2023-07-06 RX ORDER — LEVETIRACETAM 500 MG/1
500 TABLET ORAL 2 TIMES DAILY
Qty: 11 TABLET | Refills: 0 | Status: SHIPPED | OUTPATIENT
Start: 2023-07-06 | End: 2023-07-12

## 2023-07-06 RX ORDER — POTASSIUM CHLORIDE 20 MEQ/1
40 TABLET, EXTENDED RELEASE ORAL 2 TIMES DAILY
Status: DISCONTINUED | OUTPATIENT
Start: 2023-07-06 | End: 2023-07-06 | Stop reason: HOSPADM

## 2023-07-06 RX ORDER — HEPARIN SODIUM 10000 [USP'U]/ML
5000 INJECTION, SOLUTION INTRAVENOUS; SUBCUTANEOUS EVERY 8 HOURS SCHEDULED
Status: DISCONTINUED | OUTPATIENT
Start: 2023-07-06 | End: 2023-07-06 | Stop reason: HOSPADM

## 2023-07-06 RX ADMIN — MEMANTINE 10 MG: 10 TABLET ORAL at 08:57

## 2023-07-06 RX ADMIN — POTASSIUM CHLORIDE 40 MEQ: 1500 TABLET, EXTENDED RELEASE ORAL at 08:56

## 2023-07-06 RX ADMIN — LOSARTAN POTASSIUM 25 MG: 50 TABLET, FILM COATED ORAL at 08:56

## 2023-07-06 RX ADMIN — LEVETIRACETAM 500 MG: 500 TABLET, FILM COATED ORAL at 08:56

## 2023-07-06 RX ADMIN — SODIUM CHLORIDE, PRESERVATIVE FREE 10 ML: 5 INJECTION INTRAVENOUS at 08:56

## 2023-07-06 RX ADMIN — ESCITALOPRAM 5 MG: 5 TABLET, FILM COATED ORAL at 08:57

## 2023-07-06 RX ADMIN — DONEPEZIL HYDROCHLORIDE 10 MG: 5 TABLET, FILM COATED ORAL at 08:56

## 2023-07-06 RX ADMIN — BUTALBITAL, ACETAMINOPHEN, AND CAFFEINE 1 TABLET: 50; 325; 40 TABLET ORAL at 08:57

## 2023-07-06 RX ADMIN — LEVOTHYROXINE SODIUM 50 MCG: 0.03 TABLET ORAL at 06:47

## 2023-07-06 RX ADMIN — FOLIC ACID 2 MG: 1 TABLET ORAL at 08:56

## 2023-07-06 RX ADMIN — METOPROLOL SUCCINATE 25 MG: 25 TABLET, EXTENDED RELEASE ORAL at 08:56

## 2023-07-06 RX ADMIN — HEPARIN SODIUM 5000 UNITS: 10000 INJECTION INTRAVENOUS; SUBCUTANEOUS at 14:03

## 2023-07-06 ASSESSMENT — PAIN SCALES - GENERAL
PAINLEVEL_OUTOF10: 0
PAINLEVEL_OUTOF10: 0
PAINLEVEL_OUTOF10: 5
PAINLEVEL_OUTOF10: 0

## 2023-07-06 ASSESSMENT — PAIN DESCRIPTION - LOCATION: LOCATION: HEAD

## 2023-07-06 ASSESSMENT — PAIN - FUNCTIONAL ASSESSMENT: PAIN_FUNCTIONAL_ASSESSMENT: ACTIVITIES ARE NOT PREVENTED

## 2023-07-06 ASSESSMENT — PAIN DESCRIPTION - DESCRIPTORS: DESCRIPTORS: ACHING

## 2023-07-06 ASSESSMENT — PAIN DESCRIPTION - ORIENTATION: ORIENTATION: POSTERIOR

## 2023-07-06 NOTE — CARE COORDINATION
discharge:      Financial    Payor: Diane Filler / Plan: [address] [employer] H[employer] / Product Type: Medicare /     Does insurance require precert for SNF: Yes    Potential assistance Purchasing Medications:    Meds-to-Beds request: Yes      Daphine Corpus [address], [address] [place] [address] [address] [facility]  Phone: [phone] Fax: [phone]    [address] [place] [address],[facility], [address] [place] [address] [address] [address]  Phone: [phone] Fax: [phone]      Notes:    Factors facilitating achievement of predicted outcomes: Family support, Motivated, Cooperative, and Pleasant    Barriers to discharge: Cognitive deficit    Additional Case Management Notes: See Above    The Plan for Transition of Care is related to the following treatment goals of Subdural hematoma ([address]) [S06. 5XAA]  SDH (subdural hematoma) ([address]) [S06. 5XAA]  Closed head injury, initial encounter [Q58.77OX]  Fall, initial encounter [W19. XXXA]  Laceration of scalp, initial encounter [P35.63ZP]    IF APPLICABLE: The Patient and/or patient representative Darylene Dace and her family were provided with a choice of provider and agrees with the discharge plan. Freedom of choice list with basic dialogue that supports the patient's individualized plan of care/goals and shares the quality data associated with the providers was provided to:     Patient Representative Name:       The Patient and/or Patient Representative Agree with the Discharge Plan?       Teresa Griffin RN  Case Management Department  Ph: [phone]    Fax: [phone]

## 2023-07-11 DIAGNOSIS — S06.5XAA SUBDURAL HEMATOMA (HCC): Primary | ICD-10-CM

## 2023-08-03 ENCOUNTER — OFFICE VISIT (OUTPATIENT)
Dept: NEUROSURGERY | Age: 88
End: 2023-08-03
Payer: MEDICARE

## 2023-08-03 ENCOUNTER — HOSPITAL ENCOUNTER (OUTPATIENT)
Dept: CT IMAGING | Age: 88
Discharge: HOME | End: 2023-08-05
Attending: NEUROLOGICAL SURGERY
Payer: MEDICARE

## 2023-08-03 VITALS
HEART RATE: 50 BPM | SYSTOLIC BLOOD PRESSURE: 129 MMHG | TEMPERATURE: 97.9 F | DIASTOLIC BLOOD PRESSURE: 60 MMHG | OXYGEN SATURATION: 98 % | HEIGHT: 64 IN | WEIGHT: 130 LBS | BODY MASS INDEX: 22.2 KG/M2

## 2023-08-03 DIAGNOSIS — S06.5XAA SUBDURAL HEMATOMA (HCC): ICD-10-CM

## 2023-08-03 DIAGNOSIS — S06.5XAA SUBDURAL HEMATOMA (HCC): Primary | ICD-10-CM

## 2023-08-03 PROCEDURE — 99213 OFFICE O/P EST LOW 20 MIN: CPT | Performed by: STUDENT IN AN ORGANIZED HEALTH CARE EDUCATION/TRAINING PROGRAM

## 2023-08-03 PROCEDURE — 70450 CT HEAD/BRAIN W/O DYE: CPT

## 2023-08-03 PROCEDURE — 1123F ACP DISCUSS/DSCN MKR DOCD: CPT | Performed by: STUDENT IN AN ORGANIZED HEALTH CARE EDUCATION/TRAINING PROGRAM

## 2023-08-04 NOTE — PROGRESS NOTES
Hospital Follow-up     This is a 81year old female who presents to the office for a 1 month follow-up s/p SDH. Subjective: Patient states she is doing well. She denies any headaches or dizziness. No numbness or weakness. No other complaints. CT Head reviewed with patient. Physical Exam:              WDWN, no apparent distress              Non-labored breathing               Vitals Stable              Alert and oriented x3              CN 3-12 intact              PERRL              EOMI              JIMENEZ well              Motor strength symmetric              Sensation to LT intact bilaterally   (-)drift           Imagin/3/2023 CT Head   Previously noted left frontal and left anterior parafalcine subdural hematoma on 2023 is no longer present. No new findings. Assessment: This is a 81 y.o.  female presenting for a 1 month follow-up s/p SDH. Plan:  -Pain control and expectations discussed  -No restrictions   -OARRS report reviewed   -Follow-up in neurosurgery clinic prn  -Call or return to neurosurgery office sooner if symptoms worsen or if new issues arise in the interim.     Electronically signed by Gloria Leal PA-C on 2023 at 11:47 AM

## 2024-01-24 ENCOUNTER — HOSPITAL ENCOUNTER (OUTPATIENT)
Dept: GENERAL RADIOLOGY | Age: 89
Discharge: HOME | End: 2024-01-26
Payer: MEDICARE

## 2024-01-24 DIAGNOSIS — R13.10 DYSPHAGIA, UNSPECIFIED TYPE: ICD-10-CM

## 2024-01-24 PROCEDURE — 92611 MOTION FLUOROSCOPY/SWALLOW: CPT

## 2024-01-24 PROCEDURE — 2500000003 HC RX 250 WO HCPCS: Performed by: RADIOLOGY

## 2024-01-24 PROCEDURE — 74230 X-RAY XM SWLNG FUNCJ C+: CPT

## 2024-01-24 PROCEDURE — 92526 ORAL FUNCTION THERAPY: CPT

## 2024-01-24 RX ADMIN — BARIUM SULFATE 10 ML: 400 PASTE ORAL at 13:44

## 2024-01-24 RX ADMIN — BARIUM SULFATE 70 ML: 0.81 POWDER, FOR SUSPENSION ORAL at 13:44

## 2024-01-24 RX ADMIN — BARIUM SULFATE 120 ML: 400 SUSPENSION ORAL at 13:44

## 2024-07-12 PROBLEM — E78.00 HYPERCHOLESTEROLEMIA: Status: ACTIVE | Noted: 2024-07-12

## 2024-07-12 PROBLEM — F02.80 DEMENTIA IN ALZHEIMER'S DISEASE WITH EARLY ONSET (HCC): Status: ACTIVE | Noted: 2024-07-12

## 2024-07-12 PROBLEM — M06.9 RHEUMATOID ARTHRITIS INVOLVING MULTIPLE JOINTS (HCC): Status: ACTIVE | Noted: 2024-07-12

## 2024-07-12 PROBLEM — G30.0 DEMENTIA IN ALZHEIMER'S DISEASE WITH EARLY ONSET (HCC): Status: ACTIVE | Noted: 2024-07-12

## 2024-11-26 ENCOUNTER — INITIAL CONSULT (OUTPATIENT)
Dept: GERIATRIC MEDICINE | Age: 89
End: 2024-11-26
Payer: MEDICARE

## 2024-11-26 VITALS
HEART RATE: 50 BPM | BODY MASS INDEX: 28.12 KG/M2 | DIASTOLIC BLOOD PRESSURE: 60 MMHG | RESPIRATION RATE: 12 BRPM | TEMPERATURE: 98.2 F | SYSTOLIC BLOOD PRESSURE: 116 MMHG | OXYGEN SATURATION: 98 % | WEIGHT: 163.8 LBS

## 2024-11-26 DIAGNOSIS — M06.9 RHEUMATOID ARTHRITIS INVOLVING MULTIPLE JOINTS (HCC): ICD-10-CM

## 2024-11-26 DIAGNOSIS — S06.5XAA SUBDURAL HEMATOMA: ICD-10-CM

## 2024-11-26 DIAGNOSIS — F01.50 MIXED DEMENTIA (HCC): Primary | ICD-10-CM

## 2024-11-26 DIAGNOSIS — L97.912 TRAUMATIC ULCER OF RIGHT LOWER LEG WITH FAT LAYER EXPOSED (HCC): ICD-10-CM

## 2024-11-26 DIAGNOSIS — F02.80 MIXED DEMENTIA (HCC): Primary | ICD-10-CM

## 2024-11-26 DIAGNOSIS — G30.9 MIXED DEMENTIA (HCC): Primary | ICD-10-CM

## 2024-11-26 PROCEDURE — 1123F ACP DISCUSS/DSCN MKR DOCD: CPT | Performed by: INTERNAL MEDICINE

## 2024-11-26 PROCEDURE — 99213 OFFICE O/P EST LOW 20 MIN: CPT | Performed by: INTERNAL MEDICINE

## 2024-11-26 PROCEDURE — 1159F MED LIST DOCD IN RCRD: CPT | Performed by: INTERNAL MEDICINE

## 2024-11-26 RX ORDER — ASPIRIN 81 MG/1
81 TABLET ORAL DAILY
COMMUNITY

## 2024-11-26 RX ORDER — PHENOL 1.4 %
1 AEROSOL, SPRAY (ML) MUCOUS MEMBRANE DAILY
COMMUNITY

## 2024-11-26 SDOH — ECONOMIC STABILITY: FOOD INSECURITY: WITHIN THE PAST 12 MONTHS, YOU WORRIED THAT YOUR FOOD WOULD RUN OUT BEFORE YOU GOT MONEY TO BUY MORE.: NEVER TRUE

## 2024-11-26 SDOH — ECONOMIC STABILITY: INCOME INSECURITY: HOW HARD IS IT FOR YOU TO PAY FOR THE VERY BASICS LIKE FOOD, HOUSING, MEDICAL CARE, AND HEATING?: NOT HARD AT ALL

## 2024-11-26 SDOH — ECONOMIC STABILITY: FOOD INSECURITY: WITHIN THE PAST 12 MONTHS, THE FOOD YOU BOUGHT JUST DIDN'T LAST AND YOU DIDN'T HAVE MONEY TO GET MORE.: NEVER TRUE

## 2024-11-26 ASSESSMENT — PATIENT HEALTH QUESTIONNAIRE - PHQ9
2. FEELING DOWN, DEPRESSED OR HOPELESS: NOT AT ALL
1. LITTLE INTEREST OR PLEASURE IN DOING THINGS: NOT AT ALL
SUM OF ALL RESPONSES TO PHQ9 QUESTIONS 1 & 2: 0
SUM OF ALL RESPONSES TO PHQ QUESTIONS 1-9: 0

## 2024-11-26 NOTE — PROGRESS NOTES
Chief Complaint   Patient presents with    Consultation     Referral from PCP, Dr Elliott re:  dementia.  Here with daughter, Aysha.  Per daughter, PCP made the referral to have Dr Conley check if meds / doses are appropriate for pt.  Per daughter, pt has lived with her for the past 5 years, she notices no change in pt symptoms -- no better, no worse.       Dr Conley notified of above.

## 2024-11-26 NOTE — PROGRESS NOTES
CC Evaluate for dementia    Here with daughter with whom she lives in [place]  For 5 years  Anniversary unknown  SLEEPS WELL   Met Bernard in high school same class  Originally from [facility]   Graduated from  , cannot remember what year  2 years ao fall 1.5 years ago with brain bleed  And passed away age 28 cerebral aneurysm  HYM? Good   According to daughter Dementia for 10 years  CVA 5 years ago  IADL's daughter   Helps folds clothes  ADL's with minimal help  Showers with help   Walks with cane   Right right  hip Sx Dr Alicea  She may do well overnight in [facility] [place]   May not know great grandchildren  Knows 3 kids  easily  John [place] story may know it   Namenda 28 mg a day  Donepezil 10 mg a day  Mtx q week for RA  Impression: Mixed Dementia                       Continence   Plan: Continue same

## 2024-12-26 ENCOUNTER — HOSPITAL ENCOUNTER (EMERGENCY)
Age: 89
Discharge: HOME | End: 2024-12-26
Attending: EMERGENCY MEDICINE
Payer: MEDICARE

## 2024-12-26 ENCOUNTER — APPOINTMENT (OUTPATIENT)
Dept: GENERAL RADIOLOGY | Age: 89
End: 2024-12-26
Payer: MEDICARE

## 2024-12-26 VITALS
SYSTOLIC BLOOD PRESSURE: 122 MMHG | BODY MASS INDEX: 28.15 KG/M2 | DIASTOLIC BLOOD PRESSURE: 64 MMHG | TEMPERATURE: 98.3 F | RESPIRATION RATE: 18 BRPM | WEIGHT: 164 LBS | OXYGEN SATURATION: 98 % | HEART RATE: 75 BPM

## 2024-12-26 DIAGNOSIS — F03.90 DEMENTIA, UNSPECIFIED DEMENTIA SEVERITY, UNSPECIFIED DEMENTIA TYPE, UNSPECIFIED WHETHER BEHAVIORAL, PSYCHOTIC, OR MOOD DISTURBANCE OR ANXIETY (HCC): ICD-10-CM

## 2024-12-26 DIAGNOSIS — U07.1 COVID-19: Primary | ICD-10-CM

## 2024-12-26 LAB
ALBUMIN SERPL-MCNC: 3.7 G/DL (ref 3.5–5.2)
ALP SERPL-CCNC: 97 U/L (ref 35–104)
ALT SERPL-CCNC: 16 U/L (ref 0–32)
ANION GAP SERPL CALCULATED.3IONS-SCNC: 10 MMOL/L (ref 7–16)
AST SERPL-CCNC: 24 U/L (ref 0–31)
BASOPHILS # BLD: 0.01 K/UL (ref 0–0.2)
BASOPHILS NFR BLD: 0 % (ref 0–2)
BILIRUB SERPL-MCNC: 0.3 MG/DL (ref 0–1.2)
BILIRUB UR QL STRIP: NEGATIVE
BNP SERPL-MCNC: 1942 PG/ML (ref 0–450)
BUN SERPL-MCNC: 17 MG/DL (ref 6–23)
CALCIUM SERPL-MCNC: 8.9 MG/DL (ref 8.6–10.2)
CHLORIDE SERPL-SCNC: 108 MMOL/L (ref 98–107)
CLARITY UR: CLEAR
CO2 SERPL-SCNC: 25 MMOL/L (ref 22–29)
COLOR UR: YELLOW
CREAT SERPL-MCNC: 0.9 MG/DL (ref 0.5–1)
EOSINOPHIL # BLD: 0.01 K/UL (ref 0.05–0.5)
EOSINOPHILS RELATIVE PERCENT: 0 % (ref 0–6)
ERYTHROCYTE [DISTWIDTH] IN BLOOD BY AUTOMATED COUNT: 14.3 % (ref 11.5–15)
GFR, ESTIMATED: 63 ML/MIN/1.73M2
GLUCOSE SERPL-MCNC: 133 MG/DL (ref 74–99)
GLUCOSE UR STRIP-MCNC: NEGATIVE MG/DL
HCT VFR BLD AUTO: 34.8 % (ref 34–48)
HGB BLD-MCNC: 11 G/DL (ref 11.5–15.5)
HGB UR QL STRIP.AUTO: NEGATIVE
IMM GRANULOCYTES # BLD AUTO: <0.03 K/UL (ref 0–0.58)
IMM GRANULOCYTES NFR BLD: 0 % (ref 0–5)
INFLUENZA A BY PCR: NOT DETECTED
INFLUENZA B BY PCR: NOT DETECTED
KETONES UR STRIP-MCNC: NEGATIVE MG/DL
LEUKOCYTE ESTERASE UR QL STRIP: NEGATIVE
LYMPHOCYTES NFR BLD: 0.96 K/UL (ref 1.5–4)
LYMPHOCYTES RELATIVE PERCENT: 19 % (ref 20–42)
MCH RBC QN AUTO: 33 PG (ref 26–35)
MCHC RBC AUTO-ENTMCNC: 31.6 G/DL (ref 32–34.5)
MCV RBC AUTO: 104.5 FL (ref 80–99.9)
MONOCYTES NFR BLD: 0.43 K/UL (ref 0.1–0.95)
MONOCYTES NFR BLD: 9 % (ref 2–12)
NEUTROPHILS NFR BLD: 72 % (ref 43–80)
NEUTS SEG NFR BLD: 3.63 K/UL (ref 1.8–7.3)
NITRITE UR QL STRIP: NEGATIVE
PH UR STRIP: 6.5 [PH] (ref 5–9)
PLATELET, FLUORESCENCE: 136 K/UL (ref 130–450)
PMV BLD AUTO: 11.8 FL (ref 7–12)
POTASSIUM SERPL-SCNC: 3.8 MMOL/L (ref 3.5–5)
PROT SERPL-MCNC: 7 G/DL (ref 6.4–8.3)
PROT UR STRIP-MCNC: NEGATIVE MG/DL
RBC # BLD AUTO: 3.33 M/UL (ref 3.5–5.5)
RBC #/AREA URNS HPF: NORMAL /HPF
SARS-COV-2 RDRP RESP QL NAA+PROBE: DETECTED
SODIUM SERPL-SCNC: 143 MMOL/L (ref 132–146)
SP GR UR STRIP: 1.01 (ref 1–1.03)
SPECIMEN DESCRIPTION: ABNORMAL
TROPONIN I SERPL HS-MCNC: 21 NG/L (ref 0–9)
TROPONIN I SERPL HS-MCNC: 23 NG/L (ref 0–9)
UROBILINOGEN UR STRIP-ACNC: 0.2 EU/DL (ref 0–1)
WBC #/AREA URNS HPF: NORMAL /HPF
WBC OTHER # BLD: 5.1 K/UL (ref 4.5–11.5)

## 2024-12-26 PROCEDURE — 84484 ASSAY OF TROPONIN QUANT: CPT

## 2024-12-26 PROCEDURE — 85025 COMPLETE CBC W/AUTO DIFF WBC: CPT

## 2024-12-26 PROCEDURE — 81001 URINALYSIS AUTO W/SCOPE: CPT

## 2024-12-26 PROCEDURE — 80053 COMPREHEN METABOLIC PANEL: CPT

## 2024-12-26 PROCEDURE — 83880 ASSAY OF NATRIURETIC PEPTIDE: CPT

## 2024-12-26 PROCEDURE — 99284 EMERGENCY DEPT VISIT MOD MDM: CPT

## 2024-12-26 PROCEDURE — 87635 SARS-COV-2 COVID-19 AMP PRB: CPT

## 2024-12-26 PROCEDURE — 87502 INFLUENZA DNA AMP PROBE: CPT

## 2024-12-26 PROCEDURE — 71045 X-RAY EXAM CHEST 1 VIEW: CPT

## 2024-12-26 RX ORDER — BROMPHENIRAMINE MALEATE, PSEUDOEPHEDRINE HYDROCHLORIDE, AND DEXTROMETHORPHAN HYDROBROMIDE 2; 30; 10 MG/5ML; MG/5ML; MG/5ML
2.5 SYRUP ORAL 4 TIMES DAILY PRN
Qty: 118 ML | Refills: 0 | Status: SHIPPED | OUTPATIENT
Start: 2024-12-26 | End: 2025-01-07

## 2024-12-26 ASSESSMENT — LIFESTYLE VARIABLES
HOW OFTEN DO YOU HAVE A DRINK CONTAINING ALCOHOL: NEVER
HOW MANY STANDARD DRINKS CONTAINING ALCOHOL DO YOU HAVE ON A TYPICAL DAY: PATIENT DOES NOT DRINK

## 2024-12-26 ASSESSMENT — PAIN - FUNCTIONAL ASSESSMENT: PAIN_FUNCTIONAL_ASSESSMENT: NONE - DENIES PAIN

## 2024-12-26 NOTE — ED PROVIDER NOTES
[facility] EMERGENCY DEPARTMENT  EMERGENCY DEPARTMENT ENCOUNTER        Pt Name: Magdalena Esqueda  MRN: [id number]  Birthdate 1935  Date of evaluation: 12/26/2024  Provider: Ric Benson II, DO  PCP: Jayna Elliott MD  Note Started: 12:37 PM EST 12/26/24    CHIEF COMPLAINT       Chief Complaint   Patient presents with    Fatigue     From home, hx of dementia, normally walks with walked, unable to walk with walked because of weakness     Cough       HISTORY OF PRESENT ILLNESS: 1 or more Elements            Magdalena Esqueda is a 89 y.o. female history of dementia, TIA, who presents for complaint of cough, congestion, fatigue.  Patient does have dementia and is chronically confused at baseline per daughter at bedside, she states that she has been slower to get around the house, no productive cough, no fevers or chills, mentally she is acting like herself per daughter.     Nursing Notes were all reviewed and agreed with or any disagreements were addressed in the HPI.      REVIEW OF EXTERNAL NOTE :       Records reviewed for medical hx, surgical, hx, and medication lists      Chart Review/External Note Review    Last Echo reviewed by Me:  No results found for: \"LVEF\", \"LVEFMODE\"          Controlled Substance Monitoring:    Acute and Chronic Pain Monitoring:        No data to display                    REVIEW OF SYSTEMS :      Positives and Pertinent negatives as per HPI.     SURGICAL HISTORY     Past Surgical History:   Procedure Laterality Date    BUNIONECTOMY      HYSTERECTOMY (CERVIX STATUS UNKNOWN)         CURRENTMEDICATIONS       Discharge Medication List as of 12/26/2024  3:50 PM        CONTINUE these medications which have NOT CHANGED    Details   aspirin 81 MG EC tablet Take 1 tablet by mouth dailyHistorical Med      Vitamin D3 125 MCG (5000 UT) TABS tablet Take 1 tablet by mouth dailyHistorical Med      Multiple Vitamins-Minerals (MULTIVITAMIN ADULTS 50+) TABS Take 1 tablet by  getting there as she was too weak and she normally does not need any help.  No chest pain or shortness of breath, no fevers or chills, she did not fall, no dysuria, no other associated complaints.    Hemodynamically stable on arrival to the emergency department, nontoxic in no acute distress.  Awake alert and oriented at baseline on exam, moving all extremities out difficulties, cranial nerves appropriate, pupils equal reactive, relatively clear and equal breath sounds bilaterally.  No particular pitting edema to lower extremities, no abdominal tenderness to palpation, physical exam otherwise unremarkable. [MB]   1412 SARS-CoV-2, Rapid(!): DETECTED [MB]      ED Course User Index  [MB] Cintia Fernando,        Medical Decision Making  Amount and/or Complexity of Data Reviewed  Labs: ordered. Decision-making details documented in ED Course.  Radiology: ordered.    Risk  Prescription drug management.        89-year-old female history of dementia presents ER for complaint of nonproductive cough for the past several days.  Per daughter at bedside she has been acting more fatigued than usual, ambulating less effectively than her baseline.  On exam she is hemodynamically stable, afebrile, no signs respite distress, lungs CTAB, no peripheral edema, peripheral pulses equal in all 4 extremities, no chest wall or abdominal tenderness, no focal neurologic deficits other than some mild confusion which is her baseline.  Differential diagnosis includes not limited to ACS, UTI, heart failure, viral URI, electrolyte derangement, anemia, pneumonia.    Patient's workup shows stable troponins, reassuring EKG, UA negative for UTI, no electrolyte derangement, normal creatinine, normal LFTs, proBNP mildly elevated at 2000 with no clinical evidence of fluid overload, influenza swab was negative, no leukocytosis, no anemia, no thrombocytopenia, no infiltrates or effusions.  Patient was found to be positive for COVID-19.  Patient ambulated